# Patient Record
Sex: FEMALE | Race: BLACK OR AFRICAN AMERICAN | NOT HISPANIC OR LATINO | Employment: STUDENT | URBAN - METROPOLITAN AREA
[De-identification: names, ages, dates, MRNs, and addresses within clinical notes are randomized per-mention and may not be internally consistent; named-entity substitution may affect disease eponyms.]

---
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
## 2025-01-06 ENCOUNTER — PHARMACY VISIT (OUTPATIENT)
Dept: PHARMACY | Facility: CLINIC | Age: 20
End: 2025-01-06
Payer: MEDICAID

## 2025-01-06 ENCOUNTER — ROUTINE PRENATAL (OUTPATIENT)
Dept: OBSTETRICS AND GYNECOLOGY | Facility: CLINIC | Age: 20
End: 2025-01-06
Payer: COMMERCIAL

## 2025-01-06 VITALS
DIASTOLIC BLOOD PRESSURE: 74 MMHG | SYSTOLIC BLOOD PRESSURE: 129 MMHG | WEIGHT: 205.39 LBS | BODY MASS INDEX: 28.65 KG/M2

## 2025-01-06 DIAGNOSIS — O99.519 ASTHMA AFFECTING PREGNANCY, ANTEPARTUM (HHS-HCC): ICD-10-CM

## 2025-01-06 DIAGNOSIS — R12 HEARTBURN DURING PREGNANCY IN SECOND TRIMESTER (HHS-HCC): ICD-10-CM

## 2025-01-06 DIAGNOSIS — Z67.91 RH NEGATIVE STATE IN ANTEPARTUM PERIOD (HHS-HCC): ICD-10-CM

## 2025-01-06 DIAGNOSIS — J45.909 ASTHMA AFFECTING PREGNANCY, ANTEPARTUM (HHS-HCC): ICD-10-CM

## 2025-01-06 DIAGNOSIS — O26.899 RH NEGATIVE STATE IN ANTEPARTUM PERIOD (HHS-HCC): ICD-10-CM

## 2025-01-06 DIAGNOSIS — O99.513 ASTHMA AFFECTING PREGNANCY IN THIRD TRIMESTER (HHS-HCC): ICD-10-CM

## 2025-01-06 DIAGNOSIS — Z3A.25 25 WEEKS GESTATION OF PREGNANCY (HHS-HCC): Primary | ICD-10-CM

## 2025-01-06 DIAGNOSIS — O26.892 HEARTBURN DURING PREGNANCY IN SECOND TRIMESTER (HHS-HCC): ICD-10-CM

## 2025-01-06 DIAGNOSIS — J45.909 ASTHMA AFFECTING PREGNANCY IN THIRD TRIMESTER (HHS-HCC): ICD-10-CM

## 2025-01-06 PROCEDURE — RXMED WILLOW AMBULATORY MEDICATION CHARGE

## 2025-01-06 RX ORDER — CALCIUM CARBONATE 200(500)MG
2 TABLET,CHEWABLE ORAL DAILY
Qty: 60 TABLET | Refills: 11 | Status: SHIPPED | OUTPATIENT
Start: 2025-01-06 | End: 2026-01-06

## 2025-01-06 RX ORDER — FAMOTIDINE 20 MG/1
20 TABLET, FILM COATED ORAL 2 TIMES DAILY
Qty: 60 TABLET | Refills: 3 | Status: SHIPPED | OUTPATIENT
Start: 2025-01-06 | End: 2026-01-06

## 2025-01-06 RX ORDER — BUDESONIDE 0.25 MG/2ML
0.25 INHALANT ORAL
Qty: 60 ML | Refills: 11 | Status: CANCELLED | OUTPATIENT
Start: 2025-01-06 | End: 2026-01-06

## 2025-01-06 RX ORDER — BUDESONIDE AND FORMOTEROL FUMARATE DIHYDRATE 160; 4.5 UG/1; UG/1
2 AEROSOL RESPIRATORY (INHALATION)
Qty: 10.2 G | Refills: 11 | Status: SHIPPED | OUTPATIENT
Start: 2025-01-06 | End: 2026-01-06

## 2025-01-06 ASSESSMENT — PAIN SCALES - GENERAL: PAINLEVEL_OUTOF10: 0 - NO PAIN

## 2025-01-06 NOTE — ASSESSMENT & PLAN NOTE
- Patient with worsening asthma this pregnancy, now with nighttime wheezing   - Rx for Symbicort sent; continue albuterol PRN   - Patient desires nebulizer kit for home use - Rx sent for machine at [facility]

## 2025-01-06 NOTE — ASSESSMENT & PLAN NOTE
- Breast pump forms completed today   - For tdap, 2T labs next visit- orders placed. Patient to  [facility]ola prior to appointment   - For Rhogam next visit   - Pepcid, tums sent for heartburn

## 2025-01-06 NOTE — PROGRESS NOTES
"Ob Follow-up  25     SUBJECTIVE    HPI: Juliet Kwok is a 19 y.o.  at 25w0d here for RPNV.      She denies contractions, vaginal bleeding, or LOF. Reports active fetal movement. Patient reports no concerns today. She is considering breast feeding, desires pump. Reports ongoing asthma sx, including nighttime awakenings with wheezing. Requires albuterol multiple times per week. Has heartburn with spicy foods.     She has decided to name her baby \"Ranjeet Billy\"     OBJECTIVE  Visit Vitals  /74   Wt 93.2 kg (205 lb 6.2 oz)   LMP 07/15/2024 (Exact Date)   BMI 28.65 kg/m²   OB Status Pregnant   Smoking Status Never   BSA 2.16 m²            ASSESSMENT & PLAN    Juliet Kwok is a 19 y.o.  at 25w0d here for the following concerns we addressed today:    25 weeks gestation of pregnancy ([facility])  - Breast pump forms completed today   - For tdap, 2T labs next visit- orders placed. Patient to  glucola prior to appointment   - For Rhogam next visit   - Pepcid, tums sent for heartburn       Rh negative state in antepartum period ([facility])  - For Rhogam next visit     Asthma affecting pregnancy, antepartum ([facility])  - Patient with worsening asthma this pregnancy, now with nighttime wheezing   - Rx for Symbicort sent; continue albuterol PRN   - Patient desires nebulizer kit for home use - Rx sent for machine at [facility]       RTC in 4 weeks,     GANGA Ko MD  PGY-II, Obstetrics & Gynecology   [facility]   "

## 2025-01-10 ENCOUNTER — APPOINTMENT (OUTPATIENT)
Dept: OBSTETRICS AND GYNECOLOGY | Facility: CLINIC | Age: 20
End: 2025-01-10
Payer: COMMERCIAL

## 2025-01-19 ENCOUNTER — HOSPITAL ENCOUNTER (OUTPATIENT)
Facility: HOSPITAL | Age: 20
End: 2025-01-19
Attending: OBSTETRICS & GYNECOLOGY | Admitting: OBSTETRICS & GYNECOLOGY
Payer: COMMERCIAL

## 2025-01-19 ENCOUNTER — HOSPITAL ENCOUNTER (EMERGENCY)
Facility: HOSPITAL | Age: 20
Discharge: OTHER INSTITUTION | End: 2025-01-19
Payer: COMMERCIAL

## 2025-01-19 ENCOUNTER — HOSPITAL ENCOUNTER (OUTPATIENT)
Facility: HOSPITAL | Age: 20
Discharge: HOME | End: 2025-01-19
Attending: OBSTETRICS & GYNECOLOGY | Admitting: OBSTETRICS & GYNECOLOGY
Payer: COMMERCIAL

## 2025-01-19 VITALS
RESPIRATION RATE: 20 BRPM | HEIGHT: 71 IN | DIASTOLIC BLOOD PRESSURE: 67 MMHG | OXYGEN SATURATION: 97 % | TEMPERATURE: 98.2 F | SYSTOLIC BLOOD PRESSURE: 127 MMHG | WEIGHT: 205.03 LBS | HEART RATE: 100 BPM | BODY MASS INDEX: 28.7 KG/M2

## 2025-01-19 PROCEDURE — 99214 OFFICE O/P EST MOD 30 MIN: CPT | Performed by: ADVANCED PRACTICE MIDWIFE

## 2025-01-19 PROCEDURE — 59025 FETAL NON-STRESS TEST: CPT | Performed by: ADVANCED PRACTICE MIDWIFE

## 2025-01-19 PROCEDURE — 99212 OFFICE O/P EST SF 10 MIN: CPT | Mod: 25

## 2025-01-19 PROCEDURE — 4500999001 HC ED NO CHARGE

## 2025-01-19 SDOH — HEALTH STABILITY: MENTAL HEALTH: SUICIDAL BEHAVIOR (LIFETIME): NO

## 2025-01-19 SDOH — HEALTH STABILITY: MENTAL HEALTH: WISH TO BE DEAD (PAST 1 MONTH): NO

## 2025-01-19 SDOH — ECONOMIC STABILITY: HOUSING INSECURITY: DO YOU FEEL UNSAFE GOING BACK TO THE PLACE WHERE YOU ARE LIVING?: NO

## 2025-01-19 SDOH — SOCIAL STABILITY: SOCIAL INSECURITY: ARE YOU OR HAVE YOU BEEN THREATENED OR ABUSED PHYSICALLY, EMOTIONALLY, OR SEXUALLY BY ANYONE?: YES

## 2025-01-19 SDOH — SOCIAL STABILITY: SOCIAL INSECURITY: DOES ANYONE TRY TO KEEP YOU FROM HAVING/CONTACTING OTHER FRIENDS OR DOING THINGS OUTSIDE YOUR HOME?: NO

## 2025-01-19 SDOH — HEALTH STABILITY: MENTAL HEALTH: HAVE YOU USED ANY PRESCRIPTION DRUGS OTHER THAN PRESCRIBED IN THE PAST 12 MONTHS?: NO

## 2025-01-19 SDOH — HEALTH STABILITY: MENTAL HEALTH: NON-SPECIFIC ACTIVE SUICIDAL THOUGHTS (PAST 1 MONTH): NO

## 2025-01-19 SDOH — SOCIAL STABILITY: SOCIAL INSECURITY: PHYSICAL ABUSE: YES, PAST (COMMENT)

## 2025-01-19 SDOH — SOCIAL STABILITY: SOCIAL INSECURITY: ABUSE SCREEN: ADULT

## 2025-01-19 SDOH — SOCIAL STABILITY: SOCIAL INSECURITY: HAS ANYONE EVER THREATENED TO HURT YOUR FAMILY OR YOUR PETS?: NO

## 2025-01-19 SDOH — HEALTH STABILITY: MENTAL HEALTH: HAVE YOU USED ANY SUBSTANCES (CANABIS, COCAINE, HEROIN, HALLUCINOGENS, INHALANTS, ETC.) IN THE PAST 12 MONTHS?: NO

## 2025-01-19 SDOH — SOCIAL STABILITY: SOCIAL INSECURITY: ARE THERE ANY APPARENT SIGNS OF INJURIES/BEHAVIORS THAT COULD BE RELATED TO ABUSE/NEGLECT?: NO

## 2025-01-19 SDOH — HEALTH STABILITY: MENTAL HEALTH: WERE YOU ABLE TO COMPLETE ALL THE BEHAVIORAL HEALTH SCREENINGS?: YES

## 2025-01-19 SDOH — SOCIAL STABILITY: SOCIAL INSECURITY: HAVE YOU HAD ANY THOUGHTS OF HARMING ANYONE ELSE?: NO

## 2025-01-19 SDOH — SOCIAL STABILITY: SOCIAL INSECURITY: VERBAL ABUSE: YES, PAST (COMMENT)

## 2025-01-19 SDOH — SOCIAL STABILITY: SOCIAL INSECURITY: DO YOU FEEL ANYONE HAS EXPLOITED OR TAKEN ADVANTAGE OF YOU FINANCIALLY OR OF YOUR PERSONAL PROPERTY?: NO

## 2025-01-19 SDOH — SOCIAL STABILITY: SOCIAL INSECURITY: HAVE YOU HAD THOUGHTS OF HARMING ANYONE ELSE?: NO

## 2025-01-19 ASSESSMENT — LIFESTYLE VARIABLES
HOW MANY STANDARD DRINKS CONTAINING ALCOHOL DO YOU HAVE ON A TYPICAL DAY: PATIENT DOES NOT DRINK
HOW OFTEN DO YOU HAVE 6 OR MORE DRINKS ON ONE OCCASION: NEVER
AUDIT-C TOTAL SCORE: 0
HOW OFTEN DO YOU HAVE A DRINK CONTAINING ALCOHOL: NEVER
SKIP TO QUESTIONS 9-10: 1
AUDIT-C TOTAL SCORE: 0

## 2025-01-19 ASSESSMENT — PATIENT HEALTH QUESTIONNAIRE - PHQ9
2. FEELING DOWN, DEPRESSED OR HOPELESS: NOT AT ALL
SUM OF ALL RESPONSES TO PHQ9 QUESTIONS 1 & 2: 0
1. LITTLE INTEREST OR PLEASURE IN DOING THINGS: NOT AT ALL

## 2025-01-19 ASSESSMENT — PAIN SCALES - GENERAL
PAINLEVEL_OUTOF10: 4
PAINLEVEL_OUTOF10: 8

## 2025-01-19 NOTE — H&P
OB Triage H&P    Assessment/Plan    Juliet Kwok is a 19 y.o.  at 26w6d, ISIDRO: 2025, by Last Menstrual Period, who presents to triage with sharp lower abdominal pain that started while getting off of the toilet approx 2 hours ago.    Plan    -Fetal monitoring reassuring, NST appropriate for gestational age  -Good fetal movement  -Up to date on prenatal care  -Continue routine prenatal care  -Discussed los suspicion for PTL at this time given cervical exam 0/0/high, no contractions. Pain likely MSK vs RLP. Discussed comfort measures including heat and maternity support belt which she has ordered.     Dispo  -Patient appropriate for discharge home, agrees with plan  -Return precautions discussed   -Follow up at next scheduled OB appointment or to triage sooner as needed      Discussed plan and reviewed with: Dr. Hooper who also reviewed NST and is in agreement with plan for discharge to home.     Pregnancy Problems (from 24 to present)       Problem Noted Diagnosed Resolved    Asthma affecting pregnancy, antepartum ([facility]) 2025 by Madai Ko MD  No    Priority:  Medium       Rh negative state in antepartum period ([facility]) 2024 by Maadi Ko MD  No    Priority:  Medium       Bacterial vaginosis in pregnancy ([facility]) 2024 by Madai Ko MD  No    Priority:  Medium       25 weeks gestation of pregnancy ([facility]) 10/7/2024 by Natasha De Leon MD  No    Priority:  Medium       Overview Addendum 2024 12:34 PM by Madai Ko MD     Desired provider in labor: [x] CNM  [] Physician  [] Blood Products: [] Yes, accepts [] No, needs counseling  [x] Initial BMI: Could not be calculated   [x] Prenatal Labs: 10/7  [x] Cervical Cancer Screening: N/A, <20 y/o  [x] Rh status: NEG, needs Rhogam at 28wks  [] Genetic Screening:  ordered  [] NT US: (11-13 wks)  [] Baby ASA (if indicated):  [x] Pregnancy dated by: LMP c/w 6wk US at OSH     [x] Anatomy US:  (19-20 wks): - wnl   [] Federal Sterilization consent signed (if indicated):  [] 1hr GCT at 24-28wks:  [] Rhogam (if indicated):   [] Fetal Surveillance (if indicated):  [] Tdap (27-32 wks, may be given up to 36 wks if initial window missed):   [] RSV (32-36 wks) (Sept. to end of ):   [x] Flu Vaccine: Declined ,       [x] Breastfeeding: Undecided, leaning toward bottle-feeding   [x] Postpartum Birth control method: Considering IUD vs Nexplanon   [] GBS at 36 - 37 wks:  [] 39 weeks discussion of IOL vs. Expectant management:  [] Mode of delivery ( anticipated ):          Vaginal bleeding in pregnancy, first trimester ([facility]) 10/7/2024 by Natasha De Leon MD  No    Priority:  Medium       Chlamydia infection affecting pregnancy in first trimester ([facility]) 2024 by AALIYAH Ngo  No    Priority:  Medium       Overview Signed 2024  2:13 PM by AALIYAH Ngo     +chlamydia , treated with azithromycin 8/15/2024  - MARTIN in 3-4 weeks         Back pain in pregnancy ([facility]) 2024 by Madai Ko MD  2024 by Madai Ko MD    Priority:  Medium       Pregnancy of unknown anatomic location ([facility]) 2024 by AALIYAH Ngo  10/7/2024 by Natasha De Leon MD    Priority:  Medium               Subjective   Trejorge presents with sharp lower abdominal pain that started at work earlier this evening while trying to get off of the toilet. States the pain was sharp and shooting. She denies any LOF, VB, contractions, endorses good FM. She has not taken anything for this pain. States that pain seems to have improved with heat packs and lying down in triage.     Prenatal Provider [place] [facility]     OB History    Para Term  AB Living   1 0 0 0 0 0   SAB IAB Ectopic Multiple Live Births   0 0 0 0 0      # Outcome Date GA Lbr Yoseph/2nd Weight Sex Type Anes PTL Lv   1 Current                No past surgical history on  file.    Social History     Tobacco Use    Smoking status: Never    Smokeless tobacco: Never   Substance Use Topics    Alcohol use: Never       No Known Allergies    Medications Prior to Admission   Medication Sig Dispense Refill Last Dose/Taking    albuterol 2.5 mg /3 mL (0.083 %) nebulizer solution Take 3 mL (2.5 mg) by nebulization every 6 hours if needed for wheezing. 75 mL 11     albuterol 2.5 mg /3 mL (0.083 %) nebulizer solution Take 3 mL (2.5 mg) by nebulization every 6 hours if needed for wheezing. 75 mL 11     albuterol 90 mcg/actuation inhaler Inhale 2 puffs every 6 hours if needed for wheezing. 18 g 11     aspirin 81 mg EC tablet Take 2 tablets (162 mg) by mouth once daily. Start after 12 weeks of pregnancy 60 tablet 11     budesonide-formoteroL (Symbicort) 160-4.5 mcg/actuation inhaler Inhale 2 puffs 2 times a day. Rinse mouth with water after use to reduce aftertaste and incidence of candidiasis. Do not swallow. 10.2 g 11     calcium carbonate (Tums) 200 mg calcium chewable tablet Chew 2 tablets (1,000 mg) once daily. 60 tablet 11     docosanol cream (Abreva) 10 % cream cream Apply topically 5 times a day. APPLY AND RUB IN until healed       famotidine (Pepcid) 20 mg tablet Take 1 tablet (20 mg) by mouth 2 times a day. For heartburn 60 tablet 3     lidocaine (Lidoderm) 5 % patch Place 1 patch over 12 hours on the skin once daily. Remove & discard patch within 12 hours or as directed by MD. Do not place over abdomen. 10 patch 0     loratadine (Claritin) 10 mg tablet Take 1 tablet (10 mg) by mouth once daily as needed for allergies.       prenatal vitamin, iron-folic, 27 mg iron-800 mcg folic acid tablet Take 1 tablet by mouth once daily. 30 tablet 0      Objective     Last Vitals  Temp Pulse Resp BP MAP O2 Sat   36.8 °C (98.2 °F) 90 20 127/67 (Simultaneous filing. User may not have seen previous data.) 90 98 %     Blood Pressures         1/19/2025  0117             BP: 127/67             Physical  Exam  General: NAD, mood appropriate  Cardiopulmonary: warm and well perfused, breathing comfortably on room air  Abdomen: Gravid, non-tender  Extremities: Symmetric  Speculum Exam: deferred  Cervix:  closed/long/high      Fetal Monitoring  , moderate, +10x10 accels, -decel  [place]: quiet      Bedside ultrasound: No    Labs in chart were reviewed.          Prenatal labs reviewed, not remarkable.

## 2025-01-24 ENCOUNTER — APPOINTMENT (OUTPATIENT)
Dept: OBSTETRICS AND GYNECOLOGY | Facility: CLINIC | Age: 20
End: 2025-01-24
Payer: COMMERCIAL

## 2025-02-03 ENCOUNTER — ROUTINE PRENATAL (OUTPATIENT)
Dept: OBSTETRICS AND GYNECOLOGY | Facility: CLINIC | Age: 20
End: 2025-02-03
Payer: COMMERCIAL

## 2025-02-03 ENCOUNTER — LAB (OUTPATIENT)
Dept: LAB | Facility: HOSPITAL | Age: 20
End: 2025-02-03
Payer: COMMERCIAL

## 2025-02-03 VITALS — BODY MASS INDEX: 29.93 KG/M2 | DIASTOLIC BLOOD PRESSURE: 78 MMHG | WEIGHT: 214.6 LBS | SYSTOLIC BLOOD PRESSURE: 118 MMHG

## 2025-02-03 DIAGNOSIS — Z67.91 RH NEGATIVE STATE IN ANTEPARTUM PERIOD (HHS-HCC): Primary | ICD-10-CM

## 2025-02-03 DIAGNOSIS — Z3A.21 21 WEEKS GESTATION OF PREGNANCY (HHS-HCC): ICD-10-CM

## 2025-02-03 DIAGNOSIS — Z3A.29 29 WEEKS GESTATION OF PREGNANCY (HHS-HCC): ICD-10-CM

## 2025-02-03 DIAGNOSIS — O26.899 RH NEGATIVE STATE IN ANTEPARTUM PERIOD (HHS-HCC): Primary | ICD-10-CM

## 2025-02-03 PROBLEM — B96.89 BACTERIAL VAGINOSIS IN PREGNANCY (HHS-HCC): Status: RESOLVED | Noted: 2024-11-04 | Resolved: 2025-02-03

## 2025-02-03 PROBLEM — Z86.19 H/O COLD SORES: Status: RESOLVED | Noted: 2024-08-29 | Resolved: 2025-02-03

## 2025-02-03 PROBLEM — A74.9 CHLAMYDIA INFECTION AFFECTING PREGNANCY IN FIRST TRIMESTER (HHS-HCC): Status: RESOLVED | Noted: 2024-08-19 | Resolved: 2025-02-03

## 2025-02-03 PROBLEM — B00.1 RECURRENT COLD SORES: Status: RESOLVED | Noted: 2023-09-26 | Resolved: 2025-02-03

## 2025-02-03 PROBLEM — L83 ACANTHOSIS NIGRICANS: Status: RESOLVED | Noted: 2023-09-26 | Resolved: 2025-02-03

## 2025-02-03 PROBLEM — O98.811 CHLAMYDIA INFECTION AFFECTING PREGNANCY IN FIRST TRIMESTER (HHS-HCC): Status: RESOLVED | Noted: 2024-08-19 | Resolved: 2025-02-03

## 2025-02-03 PROBLEM — O23.599 BACTERIAL VAGINOSIS IN PREGNANCY (HHS-HCC): Status: RESOLVED | Noted: 2024-11-04 | Resolved: 2025-02-03

## 2025-02-03 PROCEDURE — 90471 IMMUNIZATION ADMIN: CPT | Mod: GC

## 2025-02-03 PROCEDURE — 2500000004 HC RX 250 GENERAL PHARMACY W/ HCPCS (ALT 636 FOR OP/ED): Mod: SE | Performed by: STUDENT IN AN ORGANIZED HEALTH CARE EDUCATION/TRAINING PROGRAM

## 2025-02-03 PROCEDURE — 96372 THER/PROPH/DIAG INJ SC/IM: CPT | Performed by: STUDENT IN AN ORGANIZED HEALTH CARE EDUCATION/TRAINING PROGRAM

## 2025-02-03 PROCEDURE — 99214 OFFICE O/P EST MOD 30 MIN: CPT | Mod: 25,GC,TH

## 2025-02-03 PROCEDURE — 99214 OFFICE O/P EST MOD 30 MIN: CPT

## 2025-02-03 RX ADMIN — HUMAN RHO(D) IMMUNE GLOBULIN 300 MCG: 300 INJECTION, SOLUTION INTRAMUSCULAR at 11:58

## 2025-02-03 NOTE — PROGRESS NOTES
Ob Visit  25     SUBJECTIVE      HPI: Juliet Kwok is a 19 y.o.  at 29w0d here for RPNV.  She has no contractions, no bleeding, and no LOF. Reports normal fetal movement.  Patient reports no concerns.  Has questions about TDaP and Rhogam.       OBJECTIVE  Visit Vitals  /78 Comment: 114   Wt 97.3 kg (214 lb 9.6 oz)   LMP 07/15/2024 (Exact Date)   BMI 29.93 kg/m²   OB Status Pregnant   Smoking Status Never   BSA 2.21 m²            ASSESSMENT & PLAN    Juliet Kwok is a 19 y.o.  at 29w0d here for the following concerns we addressed today:    Rh negative state in antepartum period ([facility])  - Rhogam given today    29 weeks gestation of pregnancy ([facility])  - TDaP given today        Orders Placed This Encounter   Procedures    Tdap vaccine, age 7 years and older  (BOOSTRIX)    Type And Screen Is this order related to pregnancy or an upcoming surgery? Yes; Where will this surgery/delivery be performed? [facility]; What is the date of the surgery? 2025 (N/A); Has this patient ever had a transfusion? Unk...       RTC in 2 weeks      Flor Dickson MD

## 2025-02-03 NOTE — PROGRESS NOTES
OB Follow-up  25     SUBJECTIVE    HPI: Juliet Kwok is a 19 y.o.  at 29w0d here for RPNV. Denies ***contractions, ***bleeding, or ***LOF. Reports ***normal fetal movement. Patient reports ***      OBJECTIVE  Visit Vitals  LMP 07/15/2024 (Exact Date)   OB Status Pregnant   Smoking Status Never      FHT: ***    ASSESSMENT & PLAN    Juliet Kwok is a 19 y.o.  at 29w0d here for the following concerns we addressed today:    No problem-specific Assessment & Plan notes found for this encounter.       No orders of the defined types were placed in this encounter.       RTC in *** weeks    Patient seen and evaluated with  ***    Natasha De Leon MD   PGY-2, Obstetrics and Gynecology

## 2025-02-05 LAB
GLUCOSE 1H P 50 G GLC PO SERPL-MCNC: 93 MG/DL
T PALLIDUM AB SER QL IA: NEGATIVE

## 2025-02-14 ENCOUNTER — APPOINTMENT (OUTPATIENT)
Dept: OBSTETRICS AND GYNECOLOGY | Facility: CLINIC | Age: 20
End: 2025-02-14
Payer: COMMERCIAL

## 2025-02-17 ENCOUNTER — ROUTINE PRENATAL (OUTPATIENT)
Dept: OBSTETRICS AND GYNECOLOGY | Facility: CLINIC | Age: 20
End: 2025-02-17
Payer: COMMERCIAL

## 2025-02-17 VITALS — DIASTOLIC BLOOD PRESSURE: 72 MMHG | WEIGHT: 210 LBS | SYSTOLIC BLOOD PRESSURE: 128 MMHG | BODY MASS INDEX: 29.29 KG/M2

## 2025-02-17 DIAGNOSIS — J45.909 ASTHMA AFFECTING PREGNANCY, ANTEPARTUM (HHS-HCC): ICD-10-CM

## 2025-02-17 DIAGNOSIS — O09.93 SUPERVISION OF HIGH RISK PREGNANCY IN THIRD TRIMESTER (HHS-HCC): ICD-10-CM

## 2025-02-17 DIAGNOSIS — Z3A.31 31 WEEKS GESTATION OF PREGNANCY (HHS-HCC): Primary | ICD-10-CM

## 2025-02-17 DIAGNOSIS — O99.519 ASTHMA AFFECTING PREGNANCY, ANTEPARTUM (HHS-HCC): ICD-10-CM

## 2025-02-17 ASSESSMENT — ENCOUNTER SYMPTOMS
MUSCULOSKELETAL NEGATIVE: 0
NEUROLOGICAL NEGATIVE: 0
ALLERGIC/IMMUNOLOGIC NEGATIVE: 0
EYES NEGATIVE: 0
GASTROINTESTINAL NEGATIVE: 0
HEMATOLOGIC/LYMPHATIC NEGATIVE: 0
RESPIRATORY NEGATIVE: 0
PSYCHIATRIC NEGATIVE: 0
ENDOCRINE NEGATIVE: 0
CONSTITUTIONAL NEGATIVE: 0
CARDIOVASCULAR NEGATIVE: 0

## 2025-02-17 NOTE — PROGRESS NOTES
OB Follow-up  25     SUBJECTIVE    HPI: Juliet Kwok is a 19 y.o.  at 31w0d here for RPNV. Denies contractions, bleeding, or LOF. Reports normal fetal movement. Patient reports recent GI illness that she is recovered from.  Never received nebulization machine from [facility], breast pump from [employer].      OBJECTIVE  Visit Vitals  /72 Comment: pluse-83   Wt 95.3 kg (210 lb)   LMP 07/15/2024 (Exact Date)   BMI 29.29 kg/m²   OB Status Pregnant   Smoking Status Never   BSA 2.18 m²   Fundal height: 31cm  FHT: 140s    ASSESSMENT & PLAN  Juliet Kwok is a 19 y.o.  at 31w0d here for the following concerns we addressed today:    Asthma affecting pregnancy, antepartum ([facility])  At baseline today.  Never heard back from [facility] regarding nebulizer machine, encouraged to call pharmacy again for DME. If unable to obtain, will touch base with Rns in next prenatal visit in 2 weeks.    31 weeks gestation of pregnancy ([facility])  Prenatal care up to date.  CBC not sent on  tri labs, needs to be  carveout. Will need to be repeated in 2 weeks at next prenatal visit.  Issue with obtaining breast pump, will discuss with RN.       Orders Placed This Encounter   Procedures    CBC Anemia Panel With Reflex, Pregnancy     Standing Status:   Future     Number of Occurrences:   1     Standing Expiration Date:   2026     Order Specific Question:   [facility]     Answer:   Immediate [1]     Order Specific Question:   [facility] Carveout?     Answer:   CARVEOUT: SEND TO [facility]        RTC in 2 weeks    Discussed with Dr. Yessi De Leon MD   PGY-2, Obstetrics and Gynecology

## 2025-02-17 NOTE — ASSESSMENT & PLAN NOTE
Prenatal care up to date.  CBC not sent on 2nd tri labs, needs to be [facility] carveout. Will need to be repeated in 2 weeks at next prenatal visit.  Issue with obtaining breast pump, will discuss with RN.

## 2025-02-17 NOTE — ASSESSMENT & PLAN NOTE
At baseline today.  Never heard back from [facility] regarding nebulizer machine, encouraged to call pharmacy again for DME. If unable to obtain, will touch base with Rns in next prenatal visit in 2 weeks.

## 2025-02-28 ENCOUNTER — APPOINTMENT (OUTPATIENT)
Dept: OBSTETRICS AND GYNECOLOGY | Facility: CLINIC | Age: 20
End: 2025-02-28
Payer: COMMERCIAL

## 2025-03-02 PROBLEM — Z3A.33 33 WEEKS GESTATION OF PREGNANCY (HHS-HCC): Status: ACTIVE | Noted: 2024-10-07

## 2025-03-02 NOTE — PROGRESS NOTES
Ob Follow-up  25     SUBJECTIVE  HPI: Juliet Kwok is a 19 y.o.  at 33w0d here for RPNV.  Patient complains of pelvic pressure. This pressure 10/10 pain and is constant during the duration of it. She feels it may be positional. Over the past week this has occurred 3-4 times and it will last for 2-3 hours. This began about 3 weeks ago. Patient is having <1 headache per week and goes away. Patient is not taking any medication for headache or pelvic pressure. She has having contractions for about 5 minutes at a time, about twice a week, beginning 3 weeks ago., no vaginal bleeding, or no LOF. Reports increasing normal fetal movement. Patient reports no change in vaginal discharge or leakage.     Patient has not gotten nebulizer from [facility] [employer] yet.       Patient would like STI testing today, as her partner had exposure to gonorrhea/chlamdyia.    OBJECTIVE  Visit Vitals  /80   Pulse 97   Wt 100 kg (220 lb 12.8 oz)   LMP 07/15/2024 (Exact Date)   BMI 30.80 kg/m²   OB Status Pregnant   Smoking Status Never   BSA 2.24 m²        ASSESSMENT & PLAN    Juliet Kwok is a 19 y.o.  at 32w6d here for the following concerns we addressed today:    Problem List Items Addressed This Visit       Rh negative state in antepartum period ([facility])    Overview     - Rhogam given 2/3/25         Housing insecurity    Current Assessment & Plan     Connected with "[employer] planning to stop by after her appointment         Asthma affecting pregnancy, antepartum ([facility])    Current Assessment & Plan     Rarely using albuterol, not at 2x/day         33 weeks gestation of pregnancy ([facility]) - Primary    Overview     Desired provider in labor: [x] CNM  [] Physician  [x] Blood Products: [x] Yes, accepts [] No, needs counseling  [x] Initial BMI: Could not be calculated   [x] Prenatal Labs: 10/7  [x] Cervical Cancer Screening: N/A, <20 y/o  [x] Rh status: NEG, needs Rhogam at 28wks  [x] Genetic Screening:  ki  cfDNA  [x] NT US: (11-13 wks) nml  [x] Baby ASA (if indicated):  [x] Pregnancy dated by: LMP c/w 6wk US at OSH     [x] Anatomy US: (19-20 wks): 11/25- wnl   [x] Federal Sterilization consent signed (if indicated): n/a  [x] 1hr GCT at 24-28wks: drawn 2/3/25  [x] Rhogam (if indicated): 2/3/25  [] Fetal Surveillance (if indicated):  [x] Tdap (27-32 wks, may be given up to 36 wks if initial window missed): 2/3/25  [x] RSV (32-36 wks) (Sept. to end of Jan): not offered  [x] Flu Vaccine: Declined 11/4, 12/12   [ ] needs 2nd tri CBC     [x] Breastfeeding: Undecided, leaning toward bottle-feeding   [x] Postpartum Birth control method: Considering IUD vs Nexplanon   [] GBS at 36 - 37 wks:  [] 39 weeks discussion of IOL vs. Expectant management:  [] Mode of delivery (anticipated):          Current Assessment & Plan     To lab for 3rd tri CBC and HIV today         Relevant Orders    Follow Up In Obstetrics     Other Visit Diagnoses       Screening examination for STI        Relevant Orders    C. trachomatis / N. gonorrhoeae, Amplified, Urogenital    Trichomonas vaginalis, Amplified    HIV 1/2 Antigen/Antibody Screen with Reflex to Confirmation              Orders Placed This Encounter   Procedures    C. trachomatis / N. gonorrhoeae, Amplified, Urogenital     Order Specific Question:   Release result to MyChart     Answer:   Immediate    Trichomonas vaginalis, Amplified     Order Specific Question:   Release result to MyChart     Answer:   Immediate    HIV 1/2 Antigen/Antibody Screen with Reflex to Confirmation     Standing Status:   Future     Number of Occurrences:   1     Standing Expiration Date:   3/3/2026     Order Specific Question:   Release result to MyChart     Answer:   Immediate [1]        RTC in 2 weeks    Mahesh Tse, MS3    Patient seen and evaluated with medical student. I agree with the assessment above, and have made edits within text.  Patient discussed with Dr. Lenin Nicole, PGY-2

## 2025-03-03 ENCOUNTER — LAB (OUTPATIENT)
Dept: LAB | Facility: HOSPITAL | Age: 20
End: 2025-03-03
Payer: COMMERCIAL

## 2025-03-03 ENCOUNTER — ROUTINE PRENATAL (OUTPATIENT)
Dept: OBSTETRICS AND GYNECOLOGY | Facility: CLINIC | Age: 20
End: 2025-03-03
Payer: COMMERCIAL

## 2025-03-03 VITALS
BODY MASS INDEX: 30.8 KG/M2 | SYSTOLIC BLOOD PRESSURE: 128 MMHG | HEART RATE: 97 BPM | WEIGHT: 220.8 LBS | DIASTOLIC BLOOD PRESSURE: 80 MMHG

## 2025-03-03 DIAGNOSIS — Z59.819 HOUSING INSECURITY: ICD-10-CM

## 2025-03-03 DIAGNOSIS — O99.519 ASTHMA AFFECTING PREGNANCY, ANTEPARTUM (HHS-HCC): ICD-10-CM

## 2025-03-03 DIAGNOSIS — Z3A.31 31 WEEKS GESTATION OF PREGNANCY (HHS-HCC): Primary | ICD-10-CM

## 2025-03-03 DIAGNOSIS — Z11.3 SCREENING EXAMINATION FOR STI: ICD-10-CM

## 2025-03-03 DIAGNOSIS — Z3A.33 33 WEEKS GESTATION OF PREGNANCY (HHS-HCC): Primary | ICD-10-CM

## 2025-03-03 DIAGNOSIS — Z67.91 RH NEGATIVE STATE IN ANTEPARTUM PERIOD (HHS-HCC): ICD-10-CM

## 2025-03-03 DIAGNOSIS — O26.899 RH NEGATIVE STATE IN ANTEPARTUM PERIOD (HHS-HCC): ICD-10-CM

## 2025-03-03 DIAGNOSIS — J45.909 ASTHMA AFFECTING PREGNANCY, ANTEPARTUM (HHS-HCC): ICD-10-CM

## 2025-03-03 LAB
ERYTHROCYTE [DISTWIDTH] IN BLOOD BY AUTOMATED COUNT: 14.1 % (ref 11.5–14.5)
FERRITIN SERPL-MCNC: 23 NG/ML
FOLATE SERPL-MCNC: 6.7 NG/ML
HCT VFR BLD AUTO: 29.4 % (ref 36–46)
HGB BLD-MCNC: 9.1 G/DL (ref 12–16)
IRON SATN MFR SERPL: NORMAL %
IRON SERPL-MCNC: 34 UG/DL
MCH RBC QN AUTO: 25.6 PG (ref 26–34)
MCHC RBC AUTO-ENTMCNC: 31 G/DL (ref 32–36)
MCV RBC AUTO: 83 FL (ref 80–100)
NRBC BLD-RTO: 0 /100 WBCS (ref 0–0)
PLATELET # BLD AUTO: 365 X10*3/UL (ref 150–450)
RBC # BLD AUTO: 3.55 X10*6/UL (ref 4–5.2)
TIBC SERPL-MCNC: NORMAL UG/DL
UIBC SERPL-MCNC: >450 UG/DL
VIT B12 SERPL-MCNC: 238 PG/ML
WBC # BLD AUTO: 11.4 X10*3/UL (ref 4.4–11.3)

## 2025-03-03 PROCEDURE — 82728 ASSAY OF FERRITIN: CPT

## 2025-03-03 PROCEDURE — 85027 COMPLETE CBC AUTOMATED: CPT

## 2025-03-03 PROCEDURE — 99213 OFFICE O/P EST LOW 20 MIN: CPT

## 2025-03-03 PROCEDURE — 82746 ASSAY OF FOLIC ACID SERUM: CPT

## 2025-03-03 PROCEDURE — 99213 OFFICE O/P EST LOW 20 MIN: CPT | Mod: GC,TH

## 2025-03-03 PROCEDURE — 83550 IRON BINDING TEST: CPT

## 2025-03-03 PROCEDURE — 82607 VITAMIN B-12: CPT

## 2025-03-03 SDOH — ECONOMIC STABILITY - HOUSING INSECURITY: HOUSING INSTABILITY UNSPECIFIED: Z59.819

## 2025-03-03 NOTE — PROGRESS NOTES
I saw and evaluated the patient. I personally obtained the key and critical portions of the history and physical exam or was physically present for key and critical portions performed by the resident/fellow. I reviewed the resident/fellow's documentation and discussed the patient with the resident/fellow. I agree with the resident/fellow's medical decision making as documented in the note.    Kirsten Phelps MD

## 2025-03-04 ENCOUNTER — TELEPHONE (OUTPATIENT)
Dept: OBSTETRICS AND GYNECOLOGY | Facility: CLINIC | Age: 20
End: 2025-03-04
Payer: COMMERCIAL

## 2025-03-04 DIAGNOSIS — D50.9 IRON DEFICIENCY ANEMIA DURING PREGNANCY (HHS-HCC): Primary | ICD-10-CM

## 2025-03-04 DIAGNOSIS — O99.019 IRON DEFICIENCY ANEMIA DURING PREGNANCY (HHS-HCC): Primary | ICD-10-CM

## 2025-03-04 LAB
C TRACH RRNA SPEC QL NAA+PROBE: NOT DETECTED
HIV 1+2 AB+HIV1 P24 AG SERPL QL IA: NORMAL
N GONORRHOEA RRNA SPEC QL NAA+PROBE: NOT DETECTED
QUEST GC CT AMPLIFIED (ALWAYS MESSAGE): NORMAL
REFLEX ADDED, ANEMIA PANEL: NORMAL
T VAGINALIS RRNA SPEC QL NAA+PROBE: NOT DETECTED

## 2025-03-04 PROCEDURE — RXMED WILLOW AMBULATORY MEDICATION CHARGE

## 2025-03-04 RX ORDER — FERROUS SULFATE 325(65) MG
325 TABLET ORAL 2 TIMES DAILY
Qty: 60 TABLET | Refills: 1 | Status: SHIPPED | OUTPATIENT
Start: 2025-03-04 | End: 2025-05-03

## 2025-03-04 RX ORDER — POLYETHYLENE GLYCOL 3350 17 G/17G
17 POWDER, FOR SOLUTION ORAL DAILY
Qty: 60 PACKET | Refills: 0 | Status: SHIPPED | OUTPATIENT
Start: 2025-03-04 | End: 2025-05-03

## 2025-03-04 NOTE — TELEPHONE ENCOUNTER
Pt notified of need for iron, side effects, taking with oj and not with milk products. Discussed high iron foods    ----- Message from Flor Dickson sent at 3/4/2025 10:53 AM EST -----  Iron deficiency anemia, sending Rx and MyChart message but please contact patient by phone to assure she is aware and answer questions.

## 2025-03-07 ENCOUNTER — PHARMACY VISIT (OUTPATIENT)
Dept: PHARMACY | Facility: CLINIC | Age: 20
End: 2025-03-07
Payer: MEDICAID

## 2025-03-14 ENCOUNTER — APPOINTMENT (OUTPATIENT)
Dept: OBSTETRICS AND GYNECOLOGY | Facility: CLINIC | Age: 20
End: 2025-03-14
Payer: COMMERCIAL

## 2025-03-15 PROBLEM — Z3A.35 35 WEEKS GESTATION OF PREGNANCY (HHS-HCC): Status: ACTIVE | Noted: 2024-10-07

## 2025-03-17 ENCOUNTER — APPOINTMENT (OUTPATIENT)
Dept: LAB | Facility: HOSPITAL | Age: 20
End: 2025-03-17
Payer: COMMERCIAL

## 2025-03-17 ENCOUNTER — ROUTINE PRENATAL (OUTPATIENT)
Dept: OBSTETRICS AND GYNECOLOGY | Facility: CLINIC | Age: 20
End: 2025-03-17
Payer: COMMERCIAL

## 2025-03-17 VITALS
WEIGHT: 229.9 LBS | SYSTOLIC BLOOD PRESSURE: 117 MMHG | BODY MASS INDEX: 32.06 KG/M2 | HEART RATE: 121 BPM | DIASTOLIC BLOOD PRESSURE: 73 MMHG

## 2025-03-17 DIAGNOSIS — O99.519 ASTHMA AFFECTING PREGNANCY, ANTEPARTUM (HHS-HCC): ICD-10-CM

## 2025-03-17 DIAGNOSIS — J45.909 ASTHMA AFFECTING PREGNANCY, ANTEPARTUM (HHS-HCC): ICD-10-CM

## 2025-03-17 DIAGNOSIS — O99.019 IRON DEFICIENCY ANEMIA DURING PREGNANCY (HHS-HCC): ICD-10-CM

## 2025-03-17 DIAGNOSIS — Z3A.35 35 WEEKS GESTATION OF PREGNANCY (HHS-HCC): Primary | ICD-10-CM

## 2025-03-17 DIAGNOSIS — D50.9 IRON DEFICIENCY ANEMIA DURING PREGNANCY (HHS-HCC): ICD-10-CM

## 2025-03-17 DIAGNOSIS — Z59.819 HOUSING INSECURITY: ICD-10-CM

## 2025-03-17 DIAGNOSIS — O09.93 SUPERVISION OF HIGH-RISK PREGNANCY, THIRD TRIMESTER (HHS-HCC): ICD-10-CM

## 2025-03-17 DIAGNOSIS — Z30.09 GENERAL COUNSELLING AND ADVICE ON CONTRACEPTION: ICD-10-CM

## 2025-03-17 LAB
ABO GROUP (TYPE) IN BLOOD: NORMAL
ANTIBODY SCREEN: NORMAL
RH FACTOR (ANTIGEN D): NORMAL

## 2025-03-17 PROCEDURE — 86850 RBC ANTIBODY SCREEN: CPT

## 2025-03-17 PROCEDURE — 86901 BLOOD TYPING SEROLOGIC RH(D): CPT

## 2025-03-17 PROCEDURE — 99213 OFFICE O/P EST LOW 20 MIN: CPT | Mod: GC,TH

## 2025-03-17 PROCEDURE — 86870 RBC ANTIBODY IDENTIFICATION: CPT

## 2025-03-17 PROCEDURE — 99213 OFFICE O/P EST LOW 20 MIN: CPT

## 2025-03-17 PROCEDURE — 86900 BLOOD TYPING SEROLOGIC ABO: CPT

## 2025-03-17 SDOH — ECONOMIC STABILITY - HOUSING INSECURITY: HOUSING INSTABILITY UNSPECIFIED: Z59.819

## 2025-03-17 ASSESSMENT — ENCOUNTER SYMPTOMS
EYES NEGATIVE: 0
PSYCHIATRIC NEGATIVE: 0
GASTROINTESTINAL NEGATIVE: 0
MUSCULOSKELETAL NEGATIVE: 0
HEMATOLOGIC/LYMPHATIC NEGATIVE: 0
CARDIOVASCULAR NEGATIVE: 0
CONSTITUTIONAL NEGATIVE: 0
ENDOCRINE NEGATIVE: 0
ALLERGIC/IMMUNOLOGIC NEGATIVE: 0
NEUROLOGICAL NEGATIVE: 0
RESPIRATORY NEGATIVE: 0

## 2025-03-17 ASSESSMENT — PATIENT HEALTH QUESTIONNAIRE - PHQ9
1. LITTLE INTEREST OR PLEASURE IN DOING THINGS: NOT AT ALL
2. FEELING DOWN, DEPRESSED OR HOPELESS: NOT AT ALL
SUM OF ALL RESPONSES TO PHQ9 QUESTIONS 1 AND 2: 0

## 2025-03-17 ASSESSMENT — PAIN - FUNCTIONAL ASSESSMENT: PAIN_FUNCTIONAL_ASSESSMENT: 0-10

## 2025-03-17 ASSESSMENT — PAIN SCALES - GENERAL: PAINLEVEL_OUTOF10: 0 - NO PAIN

## 2025-03-17 NOTE — PROGRESS NOTES
I saw and evaluated the patient. I personally obtained the key and critical portions of the history and physical exam or was physically present for key and critical portions performed by the resident/fellow. I reviewed the resident/fellow's documentation and discussed the patient with the resident/fellow. I agree with the resident/fellow's medical decision making as documented in the note.    Flor Dickson MD

## 2025-03-17 NOTE — PROGRESS NOTES
Ob Follow-up  25     SUBJECTIVE      HPI: Juliet Kwok is a 19 y.o.  at 35w0d here for RPNV.  She has no contractions, bleeding, or LOF. Reports normal fetal movement.        OBJECTIVE  Visit Vitals  /73 Comment: 2nd reading   Pulse (!) 121   Wt 104 kg (229 lb 14.4 oz)   LMP 07/15/2024 (Exact Date)   BMI 32.06 kg/m²   OB Status Pregnant   Smoking Status Never   BSA 2.28 m²        ASSESSMENT & PLAN    Juliet Kwok is a 19 y.o.  at 35w0d here for the following concerns we addressed today:    Problem List Items Addressed This Visit       Iron deficiency anemia during pregnancy ([facility])    Overview     - noted on CBC 3/3/25, rx sent 3/4/25  - needs reticulocyte count and repeat CBC after two weeks of therapy         Current Assessment & Plan     CBC and retics today. Discussed IV iron if labs not appropriate         Relevant Orders    CBC    Reticulocytes    Housing insecurity    Current Assessment & Plan     Moved into new apartment, which is going well         Asthma affecting pregnancy, antepartum ([facility])    Current Assessment & Plan     Not taking her Symbicort, discussed restarting since she has needed her inhaler more this week         35 weeks gestation of pregnancy ([facility]) - Primary    Overview     Desired provider in labor: [x] CNM  [] Physician  [x] Blood Products: [x] Yes, accepts [] No, needs counseling  [x] Initial BMI: Could not be calculated   [x] Prenatal Labs: 10/7  [x] Cervical Cancer Screening: N/A, <20 y/o  [x] Rh status: NEG, needs Rhogam at 28wks  [x] Genetic Screening:  rr cfDNA  [x] NT US: (11-13 wks) nml  [x] Baby ASA (if indicated):  [x] Pregnancy dated by: LMP c/w 6wk US at OSH     [x] Anatomy US: (19-20 wks): - wnl   [x] Federal Sterilization consent signed (if indicated): n/a  [x] 1hr GCT at 24-28wks: drawn 2/3/25  [x] Rhogam (if indicated): 2/3/25  [] Fetal Surveillance (if indicated):  [x] Tdap (27-32 wks, may be given up to 36 wks if initial  window missed): 2/3/25  [x] RSV (32-36 wks) (Sept. to end ): not offered  [x] Flu Vaccine: Declined ,    [x] Breastfeeding: Undecided, leaning toward bottle-feeding. Breast pump ordered  [x] Postpartum Birth control method: leaning towards Nexplanon   [] GBS at 36 - 37 wks: next week  [] 39 weeks discussion of IOL vs. Expectant management: IOL  [] Mode of delivery (anticipated):  anticipated          Current Assessment & Plan     Counseled on contraceptive options. Leaning towards Nexplanon and desires effective and long term option  GBS next week  Cephalic on BSUS          Other Visit Diagnoses       Supervision of high-risk pregnancy, third trimester ([facility])        Relevant Orders    CBC    Reticulocytes    General counselling and advice on contraception                  Orders Placed This Encounter   Procedures    CBC     Standing Status:   Future     Number of Occurrences:   1     Standing Expiration Date:   3/17/2026     Order Specific Question:   Release result to MyChart     Answer:   Immediate [1]    Reticulocytes     Standing Status:   Future     Number of Occurrences:   1     Standing Expiration Date:   3/17/2026     Order Specific Question:   Release result to MyChart     Answer:   Immediate [1]        RTC in 1 week    Discussed with Dr. Yessi Nicole MD, PGY-2

## 2025-03-17 NOTE — ASSESSMENT & PLAN NOTE
Counseled on contraceptive options. Leaning towards Nexplanon and desires effective and long term option  GBS next week  Cephalic on BSUS

## 2025-03-18 LAB
BB ANTIBODY IDENTIFICATION: NORMAL
CASE #: NORMAL
ERYTHROCYTE [DISTWIDTH] IN BLOOD BY AUTOMATED COUNT: 13.8 % (ref 11–15)
HCT VFR BLD AUTO: 28.2 % (ref 35–45)
HGB BLD-MCNC: 8.9 G/DL (ref 11.7–15.5)
MCH RBC QN AUTO: 25.1 PG (ref 27–33)
MCHC RBC AUTO-ENTMCNC: 31.6 G/DL (ref 32–36)
MCV RBC AUTO: 79.4 FL (ref 80–100)
PLATELET # BLD AUTO: 343 THOUSAND/UL (ref 140–400)
PMV BLD REES-ECKER: 9.6 FL (ref 7.5–12.5)
RBC # BLD AUTO: 3.55 MILLION/UL (ref 3.8–5.1)
RETICS #: ABNORMAL CELLS/UL (ref 20000–80000)
RETICS/RBC NFR AUTO: 2.3 %
WBC # BLD AUTO: 14.8 THOUSAND/UL (ref 3.8–10.8)

## 2025-03-19 RX ORDER — FAMOTIDINE 10 MG/ML
20 INJECTION, SOLUTION INTRAVENOUS ONCE AS NEEDED
OUTPATIENT
Start: 2025-03-20

## 2025-03-19 RX ORDER — DIPHENHYDRAMINE HYDROCHLORIDE 50 MG/ML
50 INJECTION, SOLUTION INTRAMUSCULAR; INTRAVENOUS AS NEEDED
OUTPATIENT
Start: 2025-03-20

## 2025-03-19 RX ORDER — EPINEPHRINE 0.3 MG/.3ML
0.3 INJECTION SUBCUTANEOUS EVERY 5 MIN PRN
OUTPATIENT
Start: 2025-03-20

## 2025-03-19 RX ORDER — ALBUTEROL SULFATE 0.83 MG/ML
3 SOLUTION RESPIRATORY (INHALATION) AS NEEDED
OUTPATIENT
Start: 2025-03-20

## 2025-03-20 LAB — PATH REV-IMMUNOHEMATOLOGY-PR30: NORMAL

## 2025-03-21 ENCOUNTER — TELEPHONE (OUTPATIENT)
Dept: OBSTETRICS AND GYNECOLOGY | Facility: HOSPITAL | Age: 20
End: 2025-03-21

## 2025-03-24 ENCOUNTER — ROUTINE PRENATAL (OUTPATIENT)
Dept: OBSTETRICS AND GYNECOLOGY | Facility: CLINIC | Age: 20
End: 2025-03-24
Payer: COMMERCIAL

## 2025-03-24 VITALS
SYSTOLIC BLOOD PRESSURE: 137 MMHG | DIASTOLIC BLOOD PRESSURE: 78 MMHG | BODY MASS INDEX: 32.09 KG/M2 | WEIGHT: 230.1 LBS | HEART RATE: 98 BPM

## 2025-03-24 DIAGNOSIS — O99.019 IRON DEFICIENCY ANEMIA DURING PREGNANCY: ICD-10-CM

## 2025-03-24 DIAGNOSIS — Z3A.36 36 WEEKS GESTATION OF PREGNANCY (HHS-HCC): Primary | ICD-10-CM

## 2025-03-24 DIAGNOSIS — Z59.819 HOUSING INSECURITY: ICD-10-CM

## 2025-03-24 DIAGNOSIS — D50.9 IRON DEFICIENCY ANEMIA DURING PREGNANCY: ICD-10-CM

## 2025-03-24 DIAGNOSIS — J45.909 ASTHMA AFFECTING PREGNANCY, ANTEPARTUM (HHS-HCC): ICD-10-CM

## 2025-03-24 DIAGNOSIS — O99.519 ASTHMA AFFECTING PREGNANCY, ANTEPARTUM (HHS-HCC): ICD-10-CM

## 2025-03-24 PROCEDURE — 99213 OFFICE O/P EST LOW 20 MIN: CPT

## 2025-03-24 PROCEDURE — 99213 OFFICE O/P EST LOW 20 MIN: CPT | Mod: GC,TH

## 2025-03-24 RX ORDER — EPINEPHRINE 0.3 MG/.3ML
0.3 INJECTION SUBCUTANEOUS EVERY 5 MIN PRN
OUTPATIENT
Start: 2025-03-24

## 2025-03-24 RX ORDER — FAMOTIDINE 10 MG/ML
20 INJECTION, SOLUTION INTRAVENOUS ONCE AS NEEDED
OUTPATIENT
Start: 2025-03-24

## 2025-03-24 RX ORDER — DIPHENHYDRAMINE HYDROCHLORIDE 50 MG/ML
50 INJECTION, SOLUTION INTRAMUSCULAR; INTRAVENOUS AS NEEDED
OUTPATIENT
Start: 2025-03-24

## 2025-03-24 RX ORDER — HEPARIN SODIUM,PORCINE/PF 10 UNIT/ML
50 SYRINGE (ML) INTRAVENOUS AS NEEDED
OUTPATIENT
Start: 2025-03-24

## 2025-03-24 RX ORDER — ALBUTEROL SULFATE 0.83 MG/ML
3 SOLUTION RESPIRATORY (INHALATION) AS NEEDED
OUTPATIENT
Start: 2025-03-24

## 2025-03-24 RX ORDER — HEPARIN 100 UNIT/ML
500 SYRINGE INTRAVENOUS AS NEEDED
OUTPATIENT
Start: 2025-03-24

## 2025-03-24 SDOH — ECONOMIC STABILITY - HOUSING INSECURITY: HOUSING INSTABILITY UNSPECIFIED: Z59.819

## 2025-03-24 NOTE — PROGRESS NOTES
"Subjective   Patient ID [id number]   Juilet Kwok is a 19 y.o.  at 36w0d with a working estimated date of delivery of 2025, by Last Menstrual Period who presents for a routine prenatal visit. She denies vaginal bleeding, leakage of fluid, decreased fetal movements, or contractions.    Her pregnancy is complicated by:  - Anemia: last Hb 8.9, ferritin 23. Hb not improving with PO Fe. IV Fe ordered but not yet done or scheduled.  - Asthma: symbicort + albuterol rx'd. Hasn't been taking albuterol, only the symbicort. Feeling some wheezing intermittently. No CP. No SOB currently.  - Hx of housing insecurity this preg, but not has new apt, going well    Requests cervical check.    Objective   Physical Exam:   Weight: 104 kg (230 lb 1.6 oz)  Expected Total Weight Gain: 11.5 kg (25 lb)-16 kg (35 lb)   Pregravid BMI: 22.33  BP: 137/78  Fetal Heart Rate: 153             SVE 0/50/-3    Prenatal Labs  Urine Dip:  No results found for: \"KETONESU\", \"GLUCOSEUR\", \"LEUKOCYTESUR\"  Lab Results   Component Value Date    HGB 8.9 (L) 2025    HCT 28.2 (L) 2025    ABO B 2025    HEPBSAG Nonreactive 10/07/2024         Assessment/Plan   Problem List Items Addressed This Visit             ICD-10-CM    36 weeks gestation of pregnancy ([facility]) - Primary Z3A.36     - Collected GBS  - Cervix checked per pt request. Closed. Discussed s/s of labor, when to present to L&D.  - To schedule 39wk IOL at next visit         Relevant Orders    Group B Streptococcus (GBS) Prenatal Screen, Culture    Asthma affecting pregnancy, antepartum ([facility]) O99.519, J45.909     - Taking symbicort but not albuterol because pt thought she had to stop it when starting symbicort  - Advised she can take albuterol prn for symptoms         Housing insecurity Z59.819     - Now has stable housing  - seeing benita kuo, FFL referral placed today         Relevant Orders    Referral to [facility]    Iron deficiency anemia during pregnancy " ([facility]) O99.019, D50.9     - last Hb 8.9, ferritin 23. Hb not improving with PO Fe.  - Scheduled IV Fe during visit today for tomorrow 3/25 at 0830            Follow up in 1 week for a routine prenatal visit.    Pt seen and d/w Dr. Taylor Wood MD PGY-3

## 2025-03-24 NOTE — ASSESSMENT & PLAN NOTE
- Taking symbicort but not albuterol because pt thought she had to stop it when starting symbicort  - Advised she can take albuterol prn for symptoms

## 2025-03-27 LAB — GP B STREP SPEC QL CULT: NORMAL

## 2025-03-28 ENCOUNTER — APPOINTMENT (OUTPATIENT)
Dept: OBSTETRICS AND GYNECOLOGY | Facility: CLINIC | Age: 20
End: 2025-03-28
Payer: COMMERCIAL

## 2025-03-31 ENCOUNTER — ROUTINE PRENATAL (OUTPATIENT)
Dept: OBSTETRICS AND GYNECOLOGY | Facility: CLINIC | Age: 20
End: 2025-03-31
Payer: COMMERCIAL

## 2025-03-31 VITALS
DIASTOLIC BLOOD PRESSURE: 73 MMHG | HEART RATE: 93 BPM | BODY MASS INDEX: 32.36 KG/M2 | SYSTOLIC BLOOD PRESSURE: 123 MMHG | WEIGHT: 232 LBS

## 2025-03-31 DIAGNOSIS — O99.019 IRON DEFICIENCY ANEMIA DURING PREGNANCY: ICD-10-CM

## 2025-03-31 DIAGNOSIS — Z3A.37 37 WEEKS GESTATION OF PREGNANCY (HHS-HCC): Primary | ICD-10-CM

## 2025-03-31 DIAGNOSIS — O99.519 ASTHMA AFFECTING PREGNANCY, ANTEPARTUM (HHS-HCC): ICD-10-CM

## 2025-03-31 DIAGNOSIS — Z67.91 RH NEGATIVE STATE IN ANTEPARTUM PERIOD (HHS-HCC): ICD-10-CM

## 2025-03-31 DIAGNOSIS — D50.9 IRON DEFICIENCY ANEMIA DURING PREGNANCY: ICD-10-CM

## 2025-03-31 DIAGNOSIS — J45.909 ASTHMA AFFECTING PREGNANCY, ANTEPARTUM (HHS-HCC): ICD-10-CM

## 2025-03-31 DIAGNOSIS — O26.899 RH NEGATIVE STATE IN ANTEPARTUM PERIOD (HHS-HCC): ICD-10-CM

## 2025-03-31 PROCEDURE — 99213 OFFICE O/P EST LOW 20 MIN: CPT | Mod: GE,TH

## 2025-03-31 PROCEDURE — 99213 OFFICE O/P EST LOW 20 MIN: CPT

## 2025-03-31 ASSESSMENT — PAIN SCALES - GENERAL: PAINLEVEL_OUTOF10: 0 - NO PAIN

## 2025-03-31 NOTE — ASSESSMENT & PLAN NOTE
- Most recent Hgb. 8.9, Ferritin of 23   - For IV Fe, previously no-showed appt. Needs help re-scheduling. Will task RN.

## 2025-03-31 NOTE — ASSESSMENT & PLAN NOTE
- Desires IOL- will message for 39wk scheduling   - For postpartum Nexplanon  - Desires to breast and formula feed- has pump and all necessary baby supplies

## 2025-03-31 NOTE — PROGRESS NOTES
Ob Follow-up  25     SUBJECTIVE    HPI: Juliet Kwok is a 19 y.o.  at 37w0d here for RPNV.  She reports BH contractions; denies vaginal bleeding. Had one episode of increased clear vaginal discharge but no LOF. Reports active FM. No concerns today, she is ready to meet her baby boy.    OBJECTIVE  Visit Vitals  /73   Pulse 93   Wt 105 kg (232 lb)   LMP 07/15/2024 (Exact Date)   BMI 32.36 kg/m²   OB Status Pregnant   Smoking Status Never   BSA 2.29 m²        ASSESSMENT & PLAN    Juliet Kwok is a 19 y.o.  at 37w0d here for the following concerns we addressed today:    37 weeks gestation of pregnancy ([facility])  - Desires IOL- will message for 39wk scheduling   - For postpartum Nexplanon  - Desires to breast and formula feed- has pump and all necessary baby supplies       Rh negative state in antepartum period ([facility])  - s/p Rhogam     Asthma affecting pregnancy, antepartum ([facility])  - Continue Symbicort, Albuterol PRN    Iron deficiency anemia during pregnancy ([facility])  - Most recent Hgb. 8.9, Ferritin of 23   - For IV Fe, previously no-showed appt. Needs help re-scheduling. Will task RN.        RTC in 1 week    Seen & d/w SARAH Farley. Nathalie Ko MD  PGY-II, Obstetrics & Gynecology   [facility]

## 2025-04-03 ENCOUNTER — HOSPITAL ENCOUNTER (OUTPATIENT)
Facility: HOSPITAL | Age: 20
Discharge: HOME | End: 2025-04-04
Attending: SPECIALIST | Admitting: SPECIALIST
Payer: COMMERCIAL

## 2025-04-03 LAB
BILIRUBIN, POC: NEGATIVE
BLOOD URINE, POC: POSITIVE
CLARITY, POC: CLEAR
COLOR, POC: YELLOW
GLUCOSE URINE, POC: NEGATIVE
KETONES, POC: POSITIVE
LEUKOCYTE EST, POC: NORMAL
NITRITE, POC: NEGATIVE
PH, POC: 6.5
POC APPEARANCE OF BODY FLUID: NORMAL
SPECIFIC GRAVITY, POC: 1.02
URINE PROTEIN, POC: NORMAL
UROBILINOGEN, POC: 1

## 2025-04-03 PROCEDURE — 4500999001 HC ED NO CHARGE

## 2025-04-03 PROCEDURE — 93005 ELECTROCARDIOGRAM TRACING: CPT | Mod: 59

## 2025-04-03 PROCEDURE — 2500000001 HC RX 250 WO HCPCS SELF ADMINISTERED DRUGS (ALT 637 FOR MEDICARE OP): Mod: SE

## 2025-04-03 PROCEDURE — 2500000004 HC RX 250 GENERAL PHARMACY W/ HCPCS (ALT 636 FOR OP/ED): Mod: SE

## 2025-04-03 PROCEDURE — 93010 ELECTROCARDIOGRAM REPORT: CPT | Performed by: STUDENT IN AN ORGANIZED HEALTH CARE EDUCATION/TRAINING PROGRAM

## 2025-04-03 PROCEDURE — 59025 FETAL NON-STRESS TEST: CPT | Mod: GC

## 2025-04-03 RX ORDER — ONDANSETRON 4 MG/1
4 TABLET, FILM COATED ORAL EVERY 6 HOURS PRN
Status: DISCONTINUED | OUTPATIENT
Start: 2025-04-03 | End: 2025-04-04 | Stop reason: HOSPADM

## 2025-04-03 RX ORDER — METOCLOPRAMIDE HYDROCHLORIDE 5 MG/ML
10 INJECTION INTRAMUSCULAR; INTRAVENOUS EVERY 6 HOURS PRN
Status: DISCONTINUED | OUTPATIENT
Start: 2025-04-03 | End: 2025-04-04 | Stop reason: HOSPADM

## 2025-04-03 RX ORDER — ONDANSETRON HYDROCHLORIDE 2 MG/ML
4 INJECTION, SOLUTION INTRAVENOUS EVERY 6 HOURS PRN
Status: DISCONTINUED | OUTPATIENT
Start: 2025-04-03 | End: 2025-04-04 | Stop reason: HOSPADM

## 2025-04-03 RX ORDER — LABETALOL HYDROCHLORIDE 5 MG/ML
20 INJECTION, SOLUTION INTRAVENOUS ONCE AS NEEDED
Status: DISCONTINUED | OUTPATIENT
Start: 2025-04-03 | End: 2025-04-04 | Stop reason: HOSPADM

## 2025-04-03 RX ORDER — ACETAMINOPHEN 325 MG/1
975 TABLET ORAL ONCE
Status: COMPLETED | OUTPATIENT
Start: 2025-04-03 | End: 2025-04-03

## 2025-04-03 RX ORDER — HYDRALAZINE HYDROCHLORIDE 20 MG/ML
5 INJECTION INTRAMUSCULAR; INTRAVENOUS ONCE AS NEEDED
Status: DISCONTINUED | OUTPATIENT
Start: 2025-04-03 | End: 2025-04-04 | Stop reason: HOSPADM

## 2025-04-03 RX ORDER — CYCLOBENZAPRINE HCL 10 MG
10 TABLET ORAL ONCE
Status: COMPLETED | OUTPATIENT
Start: 2025-04-03 | End: 2025-04-03

## 2025-04-03 RX ORDER — LIDOCAINE HYDROCHLORIDE 10 MG/ML
0.5 INJECTION, SOLUTION INFILTRATION; PERINEURAL ONCE AS NEEDED
Status: DISCONTINUED | OUTPATIENT
Start: 2025-04-03 | End: 2025-04-04 | Stop reason: HOSPADM

## 2025-04-03 RX ORDER — METOCLOPRAMIDE 10 MG/1
10 TABLET ORAL EVERY 6 HOURS PRN
Status: DISCONTINUED | OUTPATIENT
Start: 2025-04-03 | End: 2025-04-04 | Stop reason: HOSPADM

## 2025-04-03 RX ADMIN — CYCLOBENZAPRINE HYDROCHLORIDE 10 MG: 10 TABLET, FILM COATED ORAL at 23:40

## 2025-04-03 RX ADMIN — ONDANSETRON HYDROCHLORIDE 4 MG: 4 TABLET, FILM COATED ORAL at 23:40

## 2025-04-03 RX ADMIN — ACETAMINOPHEN 975 MG: 325 TABLET ORAL at 23:40

## 2025-04-03 SDOH — HEALTH STABILITY: MENTAL HEALTH: HAVE YOU USED ANY SUBSTANCES (CANABIS, COCAINE, HEROIN, HALLUCINOGENS, INHALANTS, ETC.) IN THE PAST 12 MONTHS?: NO

## 2025-04-03 SDOH — HEALTH STABILITY: MENTAL HEALTH: SUICIDAL BEHAVIOR (LIFETIME): NO

## 2025-04-03 SDOH — SOCIAL STABILITY: SOCIAL INSECURITY: ABUSE SCREEN: ADULT

## 2025-04-03 SDOH — SOCIAL STABILITY: SOCIAL INSECURITY: HAVE YOU HAD THOUGHTS OF HARMING ANYONE ELSE?: NO

## 2025-04-03 SDOH — HEALTH STABILITY: MENTAL HEALTH: WERE YOU ABLE TO COMPLETE ALL THE BEHAVIORAL HEALTH SCREENINGS?: YES

## 2025-04-03 SDOH — HEALTH STABILITY: MENTAL HEALTH: NON-SPECIFIC ACTIVE SUICIDAL THOUGHTS (PAST 1 MONTH): NO

## 2025-04-03 SDOH — SOCIAL STABILITY: SOCIAL INSECURITY: ARE THERE ANY APPARENT SIGNS OF INJURIES/BEHAVIORS THAT COULD BE RELATED TO ABUSE/NEGLECT?: NO

## 2025-04-03 SDOH — SOCIAL STABILITY: SOCIAL INSECURITY: HAVE YOU HAD ANY THOUGHTS OF HARMING ANYONE ELSE?: NO

## 2025-04-03 SDOH — SOCIAL STABILITY: SOCIAL INSECURITY: ARE YOU OR HAVE YOU BEEN THREATENED OR ABUSED PHYSICALLY, EMOTIONALLY, OR SEXUALLY BY ANYONE?: NO

## 2025-04-03 SDOH — SOCIAL STABILITY: SOCIAL INSECURITY: PHYSICAL ABUSE: YES, PAST (COMMENT)

## 2025-04-03 SDOH — SOCIAL STABILITY: SOCIAL INSECURITY: DOES ANYONE TRY TO KEEP YOU FROM HAVING/CONTACTING OTHER FRIENDS OR DOING THINGS OUTSIDE YOUR HOME?: NO

## 2025-04-03 SDOH — SOCIAL STABILITY: SOCIAL INSECURITY: DO YOU FEEL ANYONE HAS EXPLOITED OR TAKEN ADVANTAGE OF YOU FINANCIALLY OR OF YOUR PERSONAL PROPERTY?: NO

## 2025-04-03 SDOH — SOCIAL STABILITY: SOCIAL INSECURITY: VERBAL ABUSE: YES, PAST (COMMENT)

## 2025-04-03 SDOH — HEALTH STABILITY: MENTAL HEALTH: HAVE YOU USED ANY PRESCRIPTION DRUGS OTHER THAN PRESCRIBED IN THE PAST 12 MONTHS?: NO

## 2025-04-03 SDOH — SOCIAL STABILITY: SOCIAL INSECURITY: HAS ANYONE EVER THREATENED TO HURT YOUR FAMILY OR YOUR PETS?: NO

## 2025-04-03 SDOH — ECONOMIC STABILITY: HOUSING INSECURITY: DO YOU FEEL UNSAFE GOING BACK TO THE PLACE WHERE YOU ARE LIVING?: NO

## 2025-04-03 SDOH — HEALTH STABILITY: MENTAL HEALTH: WISH TO BE DEAD (PAST 1 MONTH): NO

## 2025-04-03 ASSESSMENT — LIFESTYLE VARIABLES
HOW OFTEN DO YOU HAVE A DRINK CONTAINING ALCOHOL: NEVER
AUDIT-C TOTAL SCORE: 0
HOW MANY STANDARD DRINKS CONTAINING ALCOHOL DO YOU HAVE ON A TYPICAL DAY: PATIENT DOES NOT DRINK
HOW OFTEN DO YOU HAVE 6 OR MORE DRINKS ON ONE OCCASION: NEVER
AUDIT-C TOTAL SCORE: 0
SKIP TO QUESTIONS 9-10: 1

## 2025-04-03 ASSESSMENT — PATIENT HEALTH QUESTIONNAIRE - PHQ9
1. LITTLE INTEREST OR PLEASURE IN DOING THINGS: NOT AT ALL
SUM OF ALL RESPONSES TO PHQ9 QUESTIONS 1 & 2: 0
2. FEELING DOWN, DEPRESSED OR HOPELESS: NOT AT ALL

## 2025-04-03 ASSESSMENT — PAIN SCALES - GENERAL: PAINLEVEL_OUTOF10: 5 - MODERATE PAIN

## 2025-04-03 ASSESSMENT — ACTIVITIES OF DAILY LIVING (ADL): LACK_OF_TRANSPORTATION: NO

## 2025-04-04 ENCOUNTER — APPOINTMENT (OUTPATIENT)
Dept: OBSTETRICS AND GYNECOLOGY | Facility: HOSPITAL | Age: 20
End: 2025-04-04
Payer: COMMERCIAL

## 2025-04-04 VITALS
RESPIRATION RATE: 16 BRPM | BODY MASS INDEX: 33.02 KG/M2 | WEIGHT: 235.89 LBS | TEMPERATURE: 97.7 F | HEIGHT: 71 IN | OXYGEN SATURATION: 98 % | HEART RATE: 96 BPM | SYSTOLIC BLOOD PRESSURE: 102 MMHG | DIASTOLIC BLOOD PRESSURE: 50 MMHG

## 2025-04-04 PROCEDURE — 99215 OFFICE O/P EST HI 40 MIN: CPT

## 2025-04-04 ASSESSMENT — PAIN SCALES - GENERAL: PAINLEVEL_OUTOF10: 0 - NO PAIN

## 2025-04-04 NOTE — H&P
OB Triage H&P    Assessment/Plan    Juliet Kwok is a 19 y.o.  at 37w3d here for contractions. Has been feeling contractions every 6 minutes for an hour.    R/o Labor  - Contractions q6-9 min on TOCO, palpated mild to moderate  - closed on cervical exam  - Patient tachycardic during contractions, but asymptomatic and EKG with sinus tachycardia. In low 100s to 110s between contractions  - Tylenol, Flexeril, and hot packs provided with improvement in comfort  - Discussed return precautions of contractions q5min for 1-2 hours  - Patient lives 10min from hospital and feels comfortable going home    Fetal Status  -Fetal monitoring reassuring  -Good fetal movement  -Up to date on prenatal care  -Continue routine prenatal care    Dispo  -Patient appropriate for discharge home, agrees with plan  -Return precautions discussed   -Follow up at next scheduled OB appointment or to triage sooner as needed    Discussed with Dr. Lenin Nicole MD, PGY-2      Subjective   20 yo G1 at 37w3d here for contractions. Has been feeling contractions every 6 minutes for an hour. No VB, LOF. Good FM.     Pregnancy notable:  - BEN: last hgb 8s. For IV iron on Monday  - rh neg, s/p rhogam   - asthma    Prenatal Provider [place]ce    OB History    Para Term  AB Living   1 0 0 0 0 0   SAB IAB Ectopic Multiple Live Births   0 0 0 0 0      # Outcome Date GA Lbr Yoseph/2nd Weight Sex Type Anes PTL Lv   1 Current                History reviewed. No pertinent surgical history.    Social History     Tobacco Use    Smoking status: Never    Smokeless tobacco: Never   Substance Use Topics    Alcohol use: Never       No Known Allergies    Medications Prior to Admission   Medication Sig Dispense Refill Last Dose/Taking    albuterol 2.5 mg /3 mL (0.083 %) nebulizer solution Take 3 mL (2.5 mg) by nebulization every 6 hours if needed for wheezing. 75 mL 11 Past Week    albuterol 90 mcg/actuation inhaler Inhale 2 puffs every 6  hours if needed for wheezing. 18 g 11 Past Week    aspirin 81 mg EC tablet Take 2 tablets (162 mg) by mouth once daily. Start after 12 weeks of pregnancy 60 tablet 11 Past Month    budesonide-formoteroL (Symbicort) 160-4.5 mcg/actuation inhaler Inhale 2 puffs 2 times a day. Rinse mouth with water after use to reduce aftertaste and incidence of candidiasis. Do not swallow. 10.2 g 11 4/2/2025    calcium carbonate (Tums) 200 mg calcium chewable tablet Chew 2 tablets (1,000 mg) once daily. 60 tablet 11 Past Month    famotidine (Pepcid) 20 mg tablet Take 1 tablet (20 mg) by mouth 2 times a day. For heartburn 60 tablet 3 Past Month    ferrous sulfate tablet Take 1 tablet (325 mg) by mouth 2 times a day. 60 tablet 1 Past Week    lidocaine (Lidoderm) 5 % patch Place 1 patch over 12 hours on the skin once daily. Remove & discard patch within 12 hours or as directed by MD. Do not place over abdomen. 10 patch 0 Past Month    loratadine (Claritin) 10 mg tablet Take 1 tablet (10 mg) by mouth once daily as needed for allergies.   Past Month    prenatal vitamin, iron-folic, 27 mg iron-800 mcg folic acid tablet Take 1 tablet by mouth once daily. 30 tablet 0 Past Month    docosanol cream (Abreva) 10 % cream cream Apply topically 5 times a day. APPLY AND RUB IN until healed       polyethylene glycol (Glycolax, Miralax) 17 gram packet Take 17 g by mouth once daily. 60 packet 0 Unknown     Objective     Last Vitals  Temp Pulse Resp BP MAP O2 Sat   36.7 °C (98.1 °F) 107 18 132/61 88 (!) 95 %     Blood Pressures         4/3/2025  2228             BP: 132/61             Physical Exam  General: NAD, mood appropriate  Cardiopulmonary: warm and well perfused, breathing comfortably on room air  Abdomen: Gravid, non-tender.   Extremities: Symmetric  Cervix: 0/0/-3     Chaperone Present: Yes.  Chaperone Name/Title: Rita Stanley  Examination Chaperoned: Gynecological Exam     Fetal Monitoring  Baseline: 140 bpm, Variability: moderate,   Accelerations: present and Decelerations: none  Uterine Activity: q7-9 minutes  Interpretation: Reactive    Bedside ultrasound: No    Labs in chart were reviewed.

## 2025-04-07 ENCOUNTER — ROUTINE PRENATAL (OUTPATIENT)
Dept: OBSTETRICS AND GYNECOLOGY | Facility: CLINIC | Age: 20
End: 2025-04-07
Payer: COMMERCIAL

## 2025-04-07 ENCOUNTER — HOSPITAL ENCOUNTER (OUTPATIENT)
Facility: HOSPITAL | Age: 20
Discharge: HOME | End: 2025-04-07
Attending: SPECIALIST | Admitting: SPECIALIST
Payer: COMMERCIAL

## 2025-04-07 VITALS — BODY MASS INDEX: 33.46 KG/M2 | WEIGHT: 239.8 LBS | SYSTOLIC BLOOD PRESSURE: 126 MMHG | DIASTOLIC BLOOD PRESSURE: 76 MMHG

## 2025-04-07 VITALS
BODY MASS INDEX: 33.83 KG/M2 | WEIGHT: 241.62 LBS | HEIGHT: 71 IN | OXYGEN SATURATION: 97 % | DIASTOLIC BLOOD PRESSURE: 59 MMHG | HEART RATE: 105 BPM | TEMPERATURE: 99.5 F | RESPIRATION RATE: 16 BRPM | SYSTOLIC BLOOD PRESSURE: 126 MMHG

## 2025-04-07 DIAGNOSIS — Z3A.38 38 WEEKS GESTATION OF PREGNANCY (HHS-HCC): ICD-10-CM

## 2025-04-07 DIAGNOSIS — Z34.03 ENCOUNTER FOR SUPERVISION OF NORMAL PRIMIGRAVIDA IN THIRD TRIMESTER, ANTEPARTUM: Primary | ICD-10-CM

## 2025-04-07 DIAGNOSIS — D50.9 IRON DEFICIENCY ANEMIA DURING PREGNANCY: ICD-10-CM

## 2025-04-07 DIAGNOSIS — O99.019 IRON DEFICIENCY ANEMIA DURING PREGNANCY: ICD-10-CM

## 2025-04-07 PROCEDURE — 99214 OFFICE O/P EST MOD 30 MIN: CPT | Mod: 25

## 2025-04-07 PROCEDURE — 0501F PRENATAL FLOW SHEET: CPT | Performed by: NURSE PRACTITIONER

## 2025-04-07 PROCEDURE — 59025 FETAL NON-STRESS TEST: CPT | Performed by: OBSTETRICS & GYNECOLOGY

## 2025-04-07 PROCEDURE — 99214 OFFICE O/P EST MOD 30 MIN: CPT | Mod: 25,TH | Performed by: NURSE PRACTITIONER

## 2025-04-07 PROCEDURE — 2500000004 HC RX 250 GENERAL PHARMACY W/ HCPCS (ALT 636 FOR OP/ED): Mod: SE

## 2025-04-07 RX ADMIN — IRON SUCROSE 200 MG: 20 INJECTION, SOLUTION INTRAVENOUS at 21:48

## 2025-04-07 SDOH — SOCIAL STABILITY: SOCIAL INSECURITY: HAS ANYONE EVER THREATENED TO HURT YOUR FAMILY OR YOUR PETS?: NO

## 2025-04-07 SDOH — ECONOMIC STABILITY: TRANSPORTATION INSECURITY: IN THE PAST 12 MONTHS, HAS LACK OF TRANSPORTATION KEPT YOU FROM MEDICAL APPOINTMENTS OR FROM GETTING MEDICATIONS?: NO

## 2025-04-07 SDOH — SOCIAL STABILITY: SOCIAL INSECURITY
WITHIN THE LAST YEAR, HAVE YOU BEEN RAPED OR FORCED TO HAVE ANY KIND OF SEXUAL ACTIVITY BY YOUR PARTNER OR EX-PARTNER?: NO

## 2025-04-07 SDOH — SOCIAL STABILITY: SOCIAL INSECURITY: WITHIN THE LAST YEAR, HAVE YOU BEEN HUMILIATED OR EMOTIONALLY ABUSED IN OTHER WAYS BY YOUR PARTNER OR EX-PARTNER?: NO

## 2025-04-07 SDOH — SOCIAL STABILITY: SOCIAL INSECURITY: HAVE YOU HAD THOUGHTS OF HARMING ANYONE ELSE?: NO

## 2025-04-07 SDOH — SOCIAL STABILITY: SOCIAL INSECURITY: DO YOU FEEL ANYONE HAS EXPLOITED OR TAKEN ADVANTAGE OF YOU FINANCIALLY OR OF YOUR PERSONAL PROPERTY?: NO

## 2025-04-07 SDOH — HEALTH STABILITY: MENTAL HEALTH: WERE YOU ABLE TO COMPLETE ALL THE BEHAVIORAL HEALTH SCREENINGS?: YES

## 2025-04-07 SDOH — ECONOMIC STABILITY: HOUSING INSECURITY: DO YOU FEEL UNSAFE GOING BACK TO THE PLACE WHERE YOU ARE LIVING?: NO

## 2025-04-07 SDOH — SOCIAL STABILITY: SOCIAL INSECURITY: ABUSE SCREEN: ADULT

## 2025-04-07 SDOH — HEALTH STABILITY: MENTAL HEALTH: SUICIDAL BEHAVIOR (LIFETIME): NO

## 2025-04-07 SDOH — SOCIAL STABILITY: SOCIAL INSECURITY: PHYSICAL ABUSE: DENIES

## 2025-04-07 SDOH — ECONOMIC STABILITY: FOOD INSECURITY: HOW HARD IS IT FOR YOU TO PAY FOR THE VERY BASICS LIKE FOOD, HOUSING, MEDICAL CARE, AND HEATING?: NOT HARD AT ALL

## 2025-04-07 SDOH — ECONOMIC STABILITY: FOOD INSECURITY: WITHIN THE PAST 12 MONTHS, YOU WORRIED THAT YOUR FOOD WOULD RUN OUT BEFORE YOU GOT THE MONEY TO BUY MORE.: NEVER TRUE

## 2025-04-07 SDOH — HEALTH STABILITY: MENTAL HEALTH: WISH TO BE DEAD (PAST 1 MONTH): NO

## 2025-04-07 SDOH — SOCIAL STABILITY: SOCIAL INSECURITY
WITHIN THE LAST YEAR, HAVE YOU BEEN KICKED, HIT, SLAPPED, OR OTHERWISE PHYSICALLY HURT BY YOUR PARTNER OR EX-PARTNER?: NO

## 2025-04-07 SDOH — ECONOMIC STABILITY: FOOD INSECURITY: WITHIN THE PAST 12 MONTHS, THE FOOD YOU BOUGHT JUST DIDN'T LAST AND YOU DIDN'T HAVE MONEY TO GET MORE.: NEVER TRUE

## 2025-04-07 SDOH — SOCIAL STABILITY: SOCIAL INSECURITY: WITHIN THE LAST YEAR, HAVE YOU BEEN AFRAID OF YOUR PARTNER OR EX-PARTNER?: NO

## 2025-04-07 SDOH — SOCIAL STABILITY: SOCIAL INSECURITY: VERBAL ABUSE: DENIES

## 2025-04-07 SDOH — SOCIAL STABILITY: SOCIAL INSECURITY: ARE THERE ANY APPARENT SIGNS OF INJURIES/BEHAVIORS THAT COULD BE RELATED TO ABUSE/NEGLECT?: NO

## 2025-04-07 SDOH — SOCIAL STABILITY: SOCIAL INSECURITY: HAVE YOU HAD ANY THOUGHTS OF HARMING ANYONE ELSE?: NO

## 2025-04-07 SDOH — HEALTH STABILITY: MENTAL HEALTH: NON-SPECIFIC ACTIVE SUICIDAL THOUGHTS (PAST 1 MONTH): NO

## 2025-04-07 SDOH — SOCIAL STABILITY: SOCIAL INSECURITY: DOES ANYONE TRY TO KEEP YOU FROM HAVING/CONTACTING OTHER FRIENDS OR DOING THINGS OUTSIDE YOUR HOME?: NO

## 2025-04-07 SDOH — SOCIAL STABILITY: SOCIAL INSECURITY: ARE YOU OR HAVE YOU BEEN THREATENED OR ABUSED PHYSICALLY, EMOTIONALLY, OR SEXUALLY BY ANYONE?: NO

## 2025-04-07 ASSESSMENT — ENCOUNTER SYMPTOMS
RESPIRATORY NEGATIVE: 0
NEUROLOGICAL NEGATIVE: 0
PSYCHIATRIC NEGATIVE: 0
CARDIOVASCULAR NEGATIVE: 0
DEPRESSION: 0
HEMATOLOGIC/LYMPHATIC NEGATIVE: 0
CONSTITUTIONAL NEGATIVE: 0
ENDOCRINE NEGATIVE: 0
GASTROINTESTINAL NEGATIVE: 0
OCCASIONAL FEELINGS OF UNSTEADINESS: 0
ALLERGIC/IMMUNOLOGIC NEGATIVE: 0
MUSCULOSKELETAL NEGATIVE: 0
LOSS OF SENSATION IN FEET: 0
EYES NEGATIVE: 0

## 2025-04-07 ASSESSMENT — LIFESTYLE VARIABLES
SKIP TO QUESTIONS 9-10: 1
AUDIT-C TOTAL SCORE: 0
AUDIT-C TOTAL SCORE: 0
HOW OFTEN DO YOU HAVE 6 OR MORE DRINKS ON ONE OCCASION: NEVER
HOW MANY STANDARD DRINKS CONTAINING ALCOHOL DO YOU HAVE ON A TYPICAL DAY: PATIENT DOES NOT DRINK
HOW OFTEN DO YOU HAVE A DRINK CONTAINING ALCOHOL: NEVER

## 2025-04-07 ASSESSMENT — PATIENT HEALTH QUESTIONNAIRE - PHQ9
1. LITTLE INTEREST OR PLEASURE IN DOING THINGS: NOT AT ALL
2. FEELING DOWN, DEPRESSED OR HOPELESS: NOT AT ALL
1. LITTLE INTEREST OR PLEASURE IN DOING THINGS: NOT AT ALL
2. FEELING DOWN, DEPRESSED OR HOPELESS: NOT AT ALL
SUM OF ALL RESPONSES TO PHQ9 QUESTIONS 1 & 2: 0
SUM OF ALL RESPONSES TO PHQ9 QUESTIONS 1 AND 2: 0

## 2025-04-07 ASSESSMENT — ACTIVITIES OF DAILY LIVING (ADL): LACK_OF_TRANSPORTATION: NO

## 2025-04-07 ASSESSMENT — PAIN SCALES - GENERAL: PAINLEVEL_OUTOF10: 7

## 2025-04-07 NOTE — PATIENT INSTRUCTIONS
Labor Induction vs. Expectant Management  This handout was made to help you make the best choice on how to proceed after 39 weeks with factual, evidenced based information. The mass media is not a good source of information about induction of labor or birth in general.  It is always a good idea to get informed before you decide what is right for you.    If there is not a medical reason to induce your labor, you have 2 choices:    Expectant Management - which means to wait for labor to start on its own.  Most labors will start on their own by 41 weeks. If you decide to wait and labor does not start by 41 weeks - it is suggested to have an ultrasound to check the amniotic fluid level around the baby and to assess fetal well-being.  This is called a BPP (biophysical profile) and a non-stress test (NST).  Induction of Labor - the process of starting labor before natural labor begins (also called “risk reducing induction). This option can be considered as early as 39 weeks (one week before your due date). Based on a research study called “The Arrive Trial”, there was a decreased in hypertensive disorders for the mother, less  intensive care admissions, a slightly higher rate of vaginal birth versus  section.  It also showed a decrease in stillbirth.    * It is important to know that if you are choosing an induction, that your labor will be actively managed (meaning the use of medication and other interventions during labor)    We trust your body to go into spontaneous labor, but also support either decision you make for your birth.      Your cervix  As you wait for the arrival of your baby, your body is hard at work getting ready for labor and birth.  We look at five important factors when assessing your cervix to check its readiness for labor or to make a decision about the method of induction.  This is called a Verdugo Score.      Dilation (how dilated)  Effacement (how thin)  Station (where baby's head  is)  Consistency (how soft)  Position of cervix    This score can help us to determine your body's readiness for labor and is used as a tool to aid the midwife in selecting the best method to start an induction.      Methods of Labor Induction  An induction of labor can include a combination of any of the following methods:  _______________________________________________________________________________________________________________________________  Cervical Ripening   Ripening is the process of softening, thinning (effacing), and opening (dilating) the cervix. Your provider will use either medicine or mechanical techniques, or a combination of both, during this phase.     Medicine:  The most commonly used medication is Cytotec® (misoprostol). Your provider will insert it as a tablet into your vagina. This can be done every 3 hours until the cervix is about 3-4 cm dilated.    Prostaglandin (hormone) that softens and thins the cervix, may cause contractions  Has been used for approximately 25 years for labor induction  Cannot be used if you have had a prior  or surgery on your uterus  Can sometimes cause excessive uterine contractions (a medication called Terbutaline can be used to slow down the contractions)        Mechanical - Cervical Ripening Balloon:   A cervical ripening balloon is a device that causes the cervix to release natural prostaglandin hormones   A thin, flexible catheter is placed through the cervix using either a speculum or a digital exam  A small balloon at the end of the device is filled with water - this puts gentle, constant pressure on the cervix causing the release nature prostaglandins that help ripen the cervix.  As your cervix is opening, eventually the balloon will fall out; or the balloon can be deflated and removed if still in after 12  hours        _______________________________________________________________________________________________________________________________  Pitocin®  Pitocin® is a medication version of oxytocin - a hormone in your body that causes contractions to begin    Given through an IV (into the vein) line in your arm.   Started at a low dose - rate will be gradually increased every 30 minutes until contractions are occurring about every 2-3 minutes  Contractions help your cervix thin and dilate  Most patients become aware of their contractions about 30 to 60 minutes after starting Pitocin® -  you may feel the contractions as mild, period-like cramping that usually becomes stronger and more frequent as labor progresses     _______________________________________________________________________________________________________________________________  Rupture of Membranes (water breaking)  Rupture of membranes means that the bag containing the amniotic fluid around the baby has broken.     To break your bag of water, your cervix needs to be at least 2-3 cm dilated and baby's head needs to be well applied to your cervix.    Your provider will insert a tiny plastic hook into your vagina during a cervical exam to break your water - you may feel the amniotic fluid running out as the membranes break.   Your contractions are likely to become stronger and closer together, helping your labor progress      _______________________________________________________________________________________________________________________________    What will my induction look like?  If you desire an induction of labor for risk reduction, an appointment will be scheduled on the labor and delivery unit (L&D) for a date and time as early as 39 weeks (1 week prior to your due date).    Date and time of induction is subject to change or may be postponed depending on how busy L&D is or availability of nurse staffing.   Please be sure to eat a healthy  meal before you come in for your labor induction unless your care team tells you not to.        First labor and/or cervix = 1 cm or less       Prior vaginal delivery and/or first labor cervix = 2-3 cm          You and your baby will be closely monitored throughout all phases of labor. You are encouraged to change positions and move around out of the bed. Cervical exam frequency will depend on your individual labor progress. Progress means your cervix continues to dilate and efface and/or the baby is moving lower into your pelvis. The duration of labor depends on how long it takes your cervix to thin and open, and when contractions begin.     If you and your baby are doing well,          the goal is to be patient and make every effort to have a vaginal birth.    If your cervix is dilated less than 6 centimeters, the goal is to allow at least 12 to 18 hours after rupture of membranes and being on Pitocin® to progress into active labor before we consider discussing a . If labor does not progress at this stage, it is called “failed induction of labor.”      If your cervix is dilated 6 centimeters or more, the goal is to have strong, effective contractions and for labor to progress at least every 4 to 6 hours before we consider discussing a . If there are health and safety concerns about you or your baby, your care team may recommend a . If labor progress has stalled at this stage, it is called “arrest of active labor”.     If you have any questions at any time about your labor, please ask. A safe delivery is a team effort and you are a valuable member of the team. Our goal is to include you in decisions about your care.     Pain Management in Labor

## 2025-04-07 NOTE — PROGRESS NOTES
Subjective   Juliet Kwok is a 19 y.o.  at 38w0d with a working estimated date of delivery of 2025, by Last Menstrual Period who presents for a routine prenatal visit.     Patient reports intermittent [place]terson contractions. Requesting CE. She denies vaginal bleeding, leakage of fluid, decreased fetal movements, or contractions. Patient reports she was unable to attend IV iron appointment d/t car trouble.     Objective   Physical Exam:   Weight: 109 kg (239 lb 12.8 oz)  TW.2 kg (79 lb 12.8 oz)  BP: 126/76  Fetal Heart Rate: 155 Fundal Height (cm): 39 cm Presentation: Vertex  Dilation: Fingertip Effacement (%): 50 Fetal Station: -3  Postpartum Depression: Low Risk  (2024)    Benedict  Depression Scale     Last EPDS Total Score: 3     Last EPDS Self Harm Result: Never        Prenatal Labs  Lab Results   Component Value Date    HGB 8.9 (L) 2025    HCT 28.2 (L) 2025     2025    SYPHT Negative 2025     Lab Results   Component Value Date    GLUC1P 93 2025     STREPTOCOCCUS, GROUP B CULTURE   Date Value Ref Range Status   2025 SEE NOTE  Final     Comment:         STREPTOCOCCUS, GROUP B CULTURE         Micro Number:      [id number]    Test Status:       Final    Specimen Source:   Vaginal/anorectal    Specimen Quality:  Adequate    Result:            No group B Streptococcus isolated                       Note per CDC guidelines optimal recovery is                       achieved by swabbing both the lower vagina and                       rectum (through the anal sphincter).         Assessment/Plan   Problem List Items Addressed This Visit          Pregnancy    38 weeks gestation of pregnancy ([facility])    Overview     Desired provider in labor: [x] CNM  [] Physician  [x] Blood Products: [x] Yes, accepts [] No, needs counseling  [x] Initial BMI: Could not be calculated   [x] Prenatal Labs: 10/7  [x] Cervical Cancer Screening: N/A, <22 y/o  [x] Rh  status: NEG, needs Rhogam at 28wks  [x] Genetic Screening:  rr cfDNA  [x] NT US: (11-13 wks) nml  [x] Baby ASA (if indicated):  [x] Pregnancy dated by: LMP c/w 6wk US at OSH     [x] Anatomy US: (19-20 wks): - wnl   [x] Federal Sterilization consent signed (if indicated): n/a  [x] 1hr GCT at 24-28wks: drawn 2/3/25  [x] Rhogam (if indicated): 2/3/25  [] Fetal Surveillance (if indicated):  [x] Tdap (27-32 wks, may be given up to 36 wks if initial window missed): 2/3/25  [x] RSV (32-36 wks) (Sept. to end of ): not offered  [x] Flu Vaccine: Declined ,    [x] Breastfeeding: Undecided, leaning toward bottle-feeding. Breast pump ordered  [x] Postpartum Birth control method: leaning towards Nexplanon   [x] GBS at 36 - 37 wks: neg3/24  [] 39 weeks discussion of IOL vs. Expectant management: IOL  [] Mode of delivery (anticipated):  anticipated          Iron deficiency anemia during pregnancy    Overview     - noted on CBC 3/3/25, rx sent 3/4/25  - repeat hgb 8.9 (from 9.1) without increased reticulocytes. For IV iron            OBGYN Infusion    Iron deficiency anemia during pregnancy    Overview     - noted on CBC 3/3/25, rx sent 3/4/25  - repeat hgb 8.9 (from 9.1) without increased reticulocytes. For IV iron            Line Care Therapy Plan    Iron deficiency anemia during pregnancy    Overview     - noted on CBC 3/3/25, rx sent 3/4/25  - repeat hgb 8.9 (from 9.1) without increased reticulocytes. For IV iron          Other Visit Diagnoses       Encounter for supervision of normal primigravida in third trimester, antepartum    -  Primary          Reviewed lab results , will plan for IV iron on PP unit  Coping mechanisms and pain management options during labor discussed, hand out provided in AVS  Discussed methods and expectations using during IOL, hand out provided in AVS  IOL scheduled 4/15/25  Reviewed s/sx of labor, warning signs, fetal movement counts, and when to call provider  Follow up in 1 week  for scheduled IOL, plan for PP visit 4-6 weeks.    Amelie Pickens, APRN-CNM, APRN-CNP

## 2025-04-08 NOTE — H&P
OB Triage H&P    Juliet Kwok is a 19 y.o.  at 38w0d here for leakage of fluid and also complaining of spotting after having cervix checked today but now stopped     - She has no signs of ROM, is without ferning, nitrizine negative, and no pooling of fluid - urine dip was negative for blood, not in labor  - mildly tachycardic during contractions but otherwise not concerning  - discussed return precautions discharge home bleeding from cervical check prior in the day  - comfortable with going home  -IV Iron- 200 sucrose given due to anemia      Subjective   Good fetal movement. C/O bloody show., C/O of occasional contractions., reports leakage of fluid while in shower appx 1730 today    Prenatal Provider Kristi Nicole    Pregnancy Problems (from 24 to present)       Problem Noted Diagnosed Resolved    Iron deficiency anemia during pregnancy 3/4/2025 by Flor Dickson MD  No    Priority:  Medium       Overview Addendum 3/19/2025  7:54 AM by Kristi Nicole MD     - noted on CBC 3/3/25, rx sent 3/4/25  - repeat hgb 8.9 (from 9.1) without increased reticulocytes. For IV iron         Asthma affecting pregnancy, antepartum ([facility]) 2025 by Madai Ko MD  No    Priority:  Medium       Rh negative state in antepartum period ([facility]) 2024 by Madai Ko MD  No    Priority:  Medium       Overview Signed 2/3/2025 12:46 PM by Flor Dickson MD     - Rhogam given 2/3/25         38 weeks gestation of pregnancy ([facility]) 10/7/2024 by Natasha De Leon MD  No    Priority:  Medium       Overview Addendum 3/31/2025 11:49 AM by Madai Ko MD     Desired provider in labor: [x] CNM  [] Physician  [x] Blood Products: [x] Yes, accepts [] No, needs counseling  [x] Initial BMI: Could not be calculated   [x] Prenatal Labs: 10/7  [x] Cervical Cancer Screening: N/A, <22 y/o  [x] Rh status: NEG, needs Rhogam at 28wks  [x] Genetic Screening:  rr cfDNA  [x] NT US: (11-13 wks) nml  [x]  Baby ASA (if indicated):  [x] Pregnancy dated by: LMP c/w 6wk US at OSH     [x] Anatomy US: (19-20 wks): - wnl   [x] Federal Sterilization consent signed (if indicated): n/a  [x] 1hr GCT at 24-28wks: drawn 2/3/25  [x] Rhogam (if indicated): 2/3/25  [] Fetal Surveillance (if indicated):  [x] Tdap (27-32 wks, may be given up to 36 wks if initial window missed): 2/3/25  [x] RSV (32-36 wks) (Sept. to end of ): not offered  [x] Flu Vaccine: Declined ,    [x] Breastfeeding: Undecided, leaning toward bottle-feeding. Breast pump ordered  [x] Postpartum Birth control method: leaning towards Nexplanon   [x] GBS at 36 - 37 wks: neg3/24  [] 39 weeks discussion of IOL vs. Expectant management: IOL  [] Mode of delivery (anticipated):  anticipated          Vaginal bleeding in pregnancy, first trimester ([facility]) 10/7/2024 by Natasha De Leon MD  No    Priority:  Medium       Bacterial vaginosis in pregnancy ([facility]) 2024 by Madai Ko MD  2/3/2025 by Flor Dickson MD    Back pain in pregnancy ([facility]) 2024 by Madai Ko MD  2024 by Madai Ko MD    Chlamydia infection affecting pregnancy in first trimester ([facility]) 2024 by NATACHA Ngo-SARAH  2/3/2025 by Flor Dickson MD    Overview Addendum 2/3/2025 12:42 PM by Flor Dickson MD     +chlamydia , treated with azithromycin 8/15/2024  - MARTIN negative in October         Pregnancy of unknown anatomic location ([facility]) 2024 by NATACHA Ngo-SARAH  10/7/2024 by Natasha De Leon MD            OB History    Para Term  AB Living   1 0 0 0 0 0   SAB IAB Ectopic Multiple Live Births   0 0 0 0 0      # Outcome Date GA Lbr Yoseph/2nd Weight Sex Type Anes PTL Lv   1 Current                No past surgical history on file.    Social History     Tobacco Use    Smoking status: Never    Smokeless tobacco: Never   Substance Use Topics    Alcohol use: Never       No Known  Allergies    Medications Prior to Admission   Medication Sig Dispense Refill Last Dose/Taking    albuterol 2.5 mg /3 mL (0.083 %) nebulizer solution Take 3 mL (2.5 mg) by nebulization every 6 hours if needed for wheezing. 75 mL 11 Past Week    albuterol 90 mcg/actuation inhaler Inhale 2 puffs every 6 hours if needed for wheezing. 18 g 11 Past Week    budesonide-formoteroL (Symbicort) 160-4.5 mcg/actuation inhaler Inhale 2 puffs 2 times a day. Rinse mouth with water after use to reduce aftertaste and incidence of candidiasis. Do not swallow. 10.2 g 11 Past Week    aspirin 81 mg EC tablet Take 2 tablets (162 mg) by mouth once daily. Start after 12 weeks of pregnancy 60 tablet 11     calcium carbonate (Tums) 200 mg calcium chewable tablet Chew 2 tablets (1,000 mg) once daily. 60 tablet 11     docosanol cream (Abreva) 10 % cream cream Apply topically 5 times a day. APPLY AND RUB IN until healed       famotidine (Pepcid) 20 mg tablet Take 1 tablet (20 mg) by mouth 2 times a day. For heartburn 60 tablet 3     ferrous sulfate tablet Take 1 tablet (325 mg) by mouth 2 times a day. 60 tablet 1     lidocaine (Lidoderm) 5 % patch Place 1 patch over 12 hours on the skin once daily. Remove & discard patch within 12 hours or as directed by MD. Do not place over abdomen. 10 patch 0     loratadine (Claritin) 10 mg tablet Take 1 tablet (10 mg) by mouth once daily as needed for allergies.       polyethylene glycol (Glycolax, Miralax) 17 gram packet Take 17 g by mouth once daily. 60 packet 0     prenatal vitamin, iron-folic, 27 mg iron-800 mcg folic acid tablet Take 1 tablet by mouth once daily. 30 tablet 0      Objective     Last Vitals  Temp Pulse Resp BP MAP O2 Sat   37.5 °C (99.5 °F) 105 16 126/59 84 97 %         Physical Exam  General: NAD, mood appropriate  Cardiopulmonary: warm and well perfused, breathing comfortably on room air  Abdomen: Gravid, non-tender  Extremities: Symmetric  Speculum Exam: no pooling of fluid seen,  Nitrazine test is negative, Ferning test is negative, vaginal discharge is thin and white   Cervix: closed      Fetal Monitoring  Baseline: 140-150 bpm, Variability: Moderate, Accelerations: Present and Decelerations: None  Uterine Activity:  contrations present, inconsistent frequency  Interpretation: Reactive    Bedside Ultrasound: NO    Labs in chart were reviewed. Previous CBC showed low Hgb -> was scheduled for venofer infusion but missed appt          Prenatal labs reviewed, not remarkable

## 2025-04-11 ENCOUNTER — APPOINTMENT (OUTPATIENT)
Dept: OBSTETRICS AND GYNECOLOGY | Facility: CLINIC | Age: 20
End: 2025-04-11
Payer: COMMERCIAL

## 2025-04-11 LAB
ATRIAL RATE: 127 BPM
P AXIS: 60 DEGREES
P OFFSET: 215 MS
P ONSET: 169 MS
PR INTERVAL: 114 MS
Q ONSET: 226 MS
QRS COUNT: 21 BEATS
QRS DURATION: 86 MS
QT INTERVAL: 340 MS
QTC CALCULATION(BAZETT): 494 MS
QTC FREDERICIA: 436 MS
R AXIS: 47 DEGREES
T AXIS: 28 DEGREES
T OFFSET: 396 MS
VENTRICULAR RATE: 127 BPM

## 2025-04-14 ENCOUNTER — HOSPITAL ENCOUNTER (INPATIENT)
Facility: HOSPITAL | Age: 20
LOS: 3 days | Discharge: HOME | End: 2025-04-17
Attending: STUDENT IN AN ORGANIZED HEALTH CARE EDUCATION/TRAINING PROGRAM | Admitting: STUDENT IN AN ORGANIZED HEALTH CARE EDUCATION/TRAINING PROGRAM
Payer: COMMERCIAL

## 2025-04-14 ENCOUNTER — ANESTHESIA EVENT (OUTPATIENT)
Dept: OBSTETRICS AND GYNECOLOGY | Facility: HOSPITAL | Age: 20
End: 2025-04-14
Payer: COMMERCIAL

## 2025-04-14 ENCOUNTER — ANESTHESIA (OUTPATIENT)
Dept: OBSTETRICS AND GYNECOLOGY | Facility: HOSPITAL | Age: 20
End: 2025-04-14
Payer: COMMERCIAL

## 2025-04-14 ENCOUNTER — APPOINTMENT (OUTPATIENT)
Dept: OBSTETRICS AND GYNECOLOGY | Facility: HOSPITAL | Age: 20
End: 2025-04-14
Payer: COMMERCIAL

## 2025-04-14 DIAGNOSIS — Z34.90 ENCOUNTER FOR INDUCTION OF LABOR: Primary | ICD-10-CM

## 2025-04-14 DIAGNOSIS — Z3A.37 37 WEEKS GESTATION OF PREGNANCY (HHS-HCC): ICD-10-CM

## 2025-04-14 LAB
ABO GROUP (TYPE) IN BLOOD: NORMAL
ANTIBODY SCREEN: NORMAL
ERYTHROCYTE [DISTWIDTH] IN BLOOD BY AUTOMATED COUNT: 17 % (ref 11.5–14.5)
HCT VFR BLD AUTO: 30.6 % (ref 36–46)
HGB BLD-MCNC: 9.3 G/DL (ref 12–16)
MCH RBC QN AUTO: 23.6 PG (ref 26–34)
MCHC RBC AUTO-ENTMCNC: 30.4 G/DL (ref 32–36)
MCV RBC AUTO: 78 FL (ref 80–100)
NRBC BLD-RTO: 0 /100 WBCS (ref 0–0)
PLATELET # BLD AUTO: 347 X10*3/UL (ref 150–450)
RBC # BLD AUTO: 3.94 X10*6/UL (ref 4–5.2)
RH FACTOR (ANTIGEN D): NORMAL
TREPONEMA PALLIDUM IGG+IGM AB [PRESENCE] IN SERUM OR PLASMA BY IMMUNOASSAY: NONREACTIVE
WBC # BLD AUTO: 12.1 X10*3/UL (ref 4.4–11.3)

## 2025-04-14 PROCEDURE — 86901 BLOOD TYPING SEROLOGIC RH(D): CPT

## 2025-04-14 PROCEDURE — 86885 COOMBS TEST INDIRECT QUAL: CPT

## 2025-04-14 PROCEDURE — 84075 ASSAY ALKALINE PHOSPHATASE: CPT

## 2025-04-14 PROCEDURE — 86077 PHYS BLOOD BANK SERV XMATCH: CPT | Performed by: PATHOLOGY

## 2025-04-14 PROCEDURE — 59050 FETAL MONITOR W/REPORT: CPT

## 2025-04-14 PROCEDURE — 86922 COMPATIBILITY TEST ANTIGLOB: CPT

## 2025-04-14 PROCEDURE — 2500000001 HC RX 250 WO HCPCS SELF ADMINISTERED DRUGS (ALT 637 FOR MEDICARE OP): Mod: SE

## 2025-04-14 PROCEDURE — 86780 TREPONEMA PALLIDUM: CPT

## 2025-04-14 PROCEDURE — 36415 COLL VENOUS BLD VENIPUNCTURE: CPT

## 2025-04-14 PROCEDURE — 0U7C7DJ DILATION OF CERVIX WITH INTRALUM DEV, TEMP, VIA OPENING: ICD-10-PCS

## 2025-04-14 PROCEDURE — 7210000002 HC LABOR PER HOUR

## 2025-04-14 PROCEDURE — 1120000001 HC OB PRIVATE ROOM DAILY

## 2025-04-14 PROCEDURE — 2500000004 HC RX 250 GENERAL PHARMACY W/ HCPCS (ALT 636 FOR OP/ED): Mod: SE

## 2025-04-14 PROCEDURE — 3E0P7VZ INTRODUCTION OF HORMONE INTO FEMALE REPRODUCTIVE, VIA NATURAL OR ARTIFICIAL OPENING: ICD-10-PCS

## 2025-04-14 PROCEDURE — 86870 RBC ANTIBODY IDENTIFICATION: CPT

## 2025-04-14 PROCEDURE — 85027 COMPLETE CBC AUTOMATED: CPT

## 2025-04-14 RX ORDER — FLUTICASONE FUROATE AND VILANTEROL 200; 25 UG/1; UG/1
1 POWDER RESPIRATORY (INHALATION)
Status: DISCONTINUED | OUTPATIENT
Start: 2025-04-14 | End: 2025-04-17 | Stop reason: HOSPADM

## 2025-04-14 RX ORDER — TRANEXAMIC ACID 100 MG/ML
1000 INJECTION, SOLUTION INTRAVENOUS ONCE AS NEEDED
Status: DISCONTINUED | OUTPATIENT
Start: 2025-04-14 | End: 2025-04-15

## 2025-04-14 RX ORDER — OXYTOCIN/0.9 % SODIUM CHLORIDE 30/500 ML
60 PLASTIC BAG, INJECTION (ML) INTRAVENOUS ONCE AS NEEDED
Status: DISCONTINUED | OUTPATIENT
Start: 2025-04-14 | End: 2025-04-15

## 2025-04-14 RX ORDER — MISOPROSTOL 200 UG/1
800 TABLET ORAL ONCE AS NEEDED
Status: DISCONTINUED | OUTPATIENT
Start: 2025-04-14 | End: 2025-04-15

## 2025-04-14 RX ORDER — SODIUM CHLORIDE, SODIUM LACTATE, POTASSIUM CHLORIDE, CALCIUM CHLORIDE 600; 310; 30; 20 MG/100ML; MG/100ML; MG/100ML; MG/100ML
75 INJECTION, SOLUTION INTRAVENOUS CONTINUOUS
Status: DISCONTINUED | OUTPATIENT
Start: 2025-04-14 | End: 2025-04-15

## 2025-04-14 RX ORDER — ONDANSETRON HYDROCHLORIDE 2 MG/ML
4 INJECTION, SOLUTION INTRAVENOUS EVERY 6 HOURS PRN
Status: DISCONTINUED | OUTPATIENT
Start: 2025-04-14 | End: 2025-04-15

## 2025-04-14 RX ORDER — METHYLERGONOVINE MALEATE 0.2 MG/ML
0.2 INJECTION INTRAVENOUS ONCE AS NEEDED
Status: DISCONTINUED | OUTPATIENT
Start: 2025-04-14 | End: 2025-04-15

## 2025-04-14 RX ORDER — HYDRALAZINE HYDROCHLORIDE 20 MG/ML
5 INJECTION INTRAMUSCULAR; INTRAVENOUS ONCE AS NEEDED
Status: DISCONTINUED | OUTPATIENT
Start: 2025-04-14 | End: 2025-04-15

## 2025-04-14 RX ORDER — OXYTOCIN 10 [USP'U]/ML
10 INJECTION, SOLUTION INTRAMUSCULAR; INTRAVENOUS ONCE AS NEEDED
Status: DISCONTINUED | OUTPATIENT
Start: 2025-04-14 | End: 2025-04-15

## 2025-04-14 RX ORDER — CARBOPROST TROMETHAMINE 250 UG/ML
250 INJECTION, SOLUTION INTRAMUSCULAR ONCE AS NEEDED
Status: DISCONTINUED | OUTPATIENT
Start: 2025-04-14 | End: 2025-04-15

## 2025-04-14 RX ORDER — LABETALOL HYDROCHLORIDE 5 MG/ML
20 INJECTION, SOLUTION INTRAVENOUS ONCE AS NEEDED
Status: DISCONTINUED | OUTPATIENT
Start: 2025-04-14 | End: 2025-04-15

## 2025-04-14 RX ORDER — CALCIUM CARBONATE 200(500)MG
2 TABLET,CHEWABLE ORAL DAILY
Status: DISCONTINUED | OUTPATIENT
Start: 2025-04-14 | End: 2025-04-17 | Stop reason: HOSPADM

## 2025-04-14 RX ORDER — LIDOCAINE HYDROCHLORIDE 10 MG/ML
30 INJECTION, SOLUTION INFILTRATION; PERINEURAL ONCE AS NEEDED
Status: DISCONTINUED | OUTPATIENT
Start: 2025-04-14 | End: 2025-04-15

## 2025-04-14 RX ORDER — ONDANSETRON 4 MG/1
4 TABLET, FILM COATED ORAL EVERY 6 HOURS PRN
Status: DISCONTINUED | OUTPATIENT
Start: 2025-04-14 | End: 2025-04-15

## 2025-04-14 RX ORDER — OXYTOCIN/0.9 % SODIUM CHLORIDE 30/500 ML
60 PLASTIC BAG, INJECTION (ML) INTRAVENOUS ONCE AS NEEDED
Status: COMPLETED | OUTPATIENT
Start: 2025-04-14 | End: 2025-04-15

## 2025-04-14 RX ORDER — FAMOTIDINE 20 MG/1
20 TABLET, FILM COATED ORAL 2 TIMES DAILY
Status: DISCONTINUED | OUTPATIENT
Start: 2025-04-14 | End: 2025-04-17 | Stop reason: HOSPADM

## 2025-04-14 RX ORDER — LOPERAMIDE HYDROCHLORIDE 2 MG/1
4 CAPSULE ORAL EVERY 2 HOUR PRN
Status: DISCONTINUED | OUTPATIENT
Start: 2025-04-14 | End: 2025-04-15

## 2025-04-14 RX ORDER — ALBUTEROL SULFATE 90 UG/1
2 INHALANT RESPIRATORY (INHALATION) EVERY 6 HOURS PRN
Status: DISCONTINUED | OUTPATIENT
Start: 2025-04-14 | End: 2025-04-17 | Stop reason: HOSPADM

## 2025-04-14 RX ORDER — TERBUTALINE SULFATE 1 MG/ML
0.25 INJECTION SUBCUTANEOUS ONCE AS NEEDED
Status: DISCONTINUED | OUTPATIENT
Start: 2025-04-14 | End: 2025-04-15

## 2025-04-14 RX ADMIN — MISOPROSTOL 25 MCG: 100 TABLET ORAL at 23:40

## 2025-04-14 RX ADMIN — SODIUM CHLORIDE, POTASSIUM CHLORIDE, SODIUM LACTATE AND CALCIUM CHLORIDE 75 ML/HR: 600; 310; 30; 20 INJECTION, SOLUTION INTRAVENOUS at 20:03

## 2025-04-14 SDOH — HEALTH STABILITY: MENTAL HEALTH: WISH TO BE DEAD (PAST 1 MONTH): NO

## 2025-04-14 SDOH — HEALTH STABILITY: PHYSICAL HEALTH
HOW OFTEN DO YOU NEED TO HAVE SOMEONE HELP YOU WHEN YOU READ INSTRUCTIONS, PAMPHLETS, OR OTHER WRITTEN MATERIAL FROM YOUR DOCTOR OR PHARMACY?: SOMETIMES

## 2025-04-14 SDOH — HEALTH STABILITY: MENTAL HEALTH: SUICIDAL BEHAVIOR (LIFETIME): NO

## 2025-04-14 SDOH — ECONOMIC STABILITY: FOOD INSECURITY: WITHIN THE PAST 12 MONTHS, YOU WORRIED THAT YOUR FOOD WOULD RUN OUT BEFORE YOU GOT THE MONEY TO BUY MORE.: NEVER TRUE

## 2025-04-14 SDOH — SOCIAL STABILITY: SOCIAL INSECURITY: PHYSICAL ABUSE: DENIES

## 2025-04-14 SDOH — SOCIAL STABILITY: SOCIAL INSECURITY: WITHIN THE LAST YEAR, HAVE YOU BEEN HUMILIATED OR EMOTIONALLY ABUSED IN OTHER WAYS BY YOUR PARTNER OR EX-PARTNER?: NO

## 2025-04-14 SDOH — SOCIAL STABILITY: SOCIAL INSECURITY: WITHIN THE LAST YEAR, HAVE YOU BEEN AFRAID OF YOUR PARTNER OR EX-PARTNER?: NO

## 2025-04-14 SDOH — SOCIAL STABILITY: SOCIAL INSECURITY: DO YOU FEEL ANYONE HAS EXPLOITED OR TAKEN ADVANTAGE OF YOU FINANCIALLY OR OF YOUR PERSONAL PROPERTY?: NO

## 2025-04-14 SDOH — HEALTH STABILITY: MENTAL HEALTH: WERE YOU ABLE TO COMPLETE ALL THE BEHAVIORAL HEALTH SCREENINGS?: YES

## 2025-04-14 SDOH — ECONOMIC STABILITY: FOOD INSECURITY: WITHIN THE PAST 12 MONTHS, THE FOOD YOU BOUGHT JUST DIDN'T LAST AND YOU DIDN'T HAVE MONEY TO GET MORE.: NEVER TRUE

## 2025-04-14 SDOH — ECONOMIC STABILITY: FOOD INSECURITY: HOW HARD IS IT FOR YOU TO PAY FOR THE VERY BASICS LIKE FOOD, HOUSING, MEDICAL CARE, AND HEATING?: NOT VERY HARD

## 2025-04-14 SDOH — HEALTH STABILITY: MENTAL HEALTH: NON-SPECIFIC ACTIVE SUICIDAL THOUGHTS (PAST 1 MONTH): NO

## 2025-04-14 SDOH — SOCIAL STABILITY: SOCIAL INSECURITY: ARE THERE ANY APPARENT SIGNS OF INJURIES/BEHAVIORS THAT COULD BE RELATED TO ABUSE/NEGLECT?: NO

## 2025-04-14 SDOH — SOCIAL STABILITY: SOCIAL INSECURITY: HAS ANYONE EVER THREATENED TO HURT YOUR FAMILY OR YOUR PETS?: NO

## 2025-04-14 SDOH — HEALTH STABILITY: MENTAL HEALTH: HAVE YOU USED ANY SUBSTANCES (CANABIS, COCAINE, HEROIN, HALLUCINOGENS, INHALANTS, ETC.) IN THE PAST 12 MONTHS?: NO

## 2025-04-14 SDOH — SOCIAL STABILITY: SOCIAL INSECURITY: ABUSE SCREEN: ADULT

## 2025-04-14 SDOH — SOCIAL STABILITY: SOCIAL INSECURITY: VERBAL ABUSE: DENIES

## 2025-04-14 SDOH — HEALTH STABILITY: MENTAL HEALTH: HAVE YOU USED ANY PRESCRIPTION DRUGS OTHER THAN PRESCRIBED IN THE PAST 12 MONTHS?: NO

## 2025-04-14 SDOH — ECONOMIC STABILITY: HOUSING INSECURITY: DO YOU FEEL UNSAFE GOING BACK TO THE PLACE WHERE YOU ARE LIVING?: NO

## 2025-04-14 SDOH — HEALTH STABILITY: MENTAL HEALTH: CURRENT SMOKER: 0

## 2025-04-14 SDOH — SOCIAL STABILITY: SOCIAL INSECURITY: HAVE YOU HAD ANY THOUGHTS OF HARMING ANYONE ELSE?: NO

## 2025-04-14 SDOH — SOCIAL STABILITY: SOCIAL INSECURITY: HAVE YOU HAD THOUGHTS OF HARMING ANYONE ELSE?: NO

## 2025-04-14 SDOH — SOCIAL STABILITY: SOCIAL INSECURITY: ARE YOU OR HAVE YOU BEEN THREATENED OR ABUSED PHYSICALLY, EMOTIONALLY, OR SEXUALLY BY ANYONE?: NO

## 2025-04-14 SDOH — ECONOMIC STABILITY: TRANSPORTATION INSECURITY: IN THE PAST 12 MONTHS, HAS LACK OF TRANSPORTATION KEPT YOU FROM MEDICAL APPOINTMENTS OR FROM GETTING MEDICATIONS?: NO

## 2025-04-14 SDOH — SOCIAL STABILITY: SOCIAL INSECURITY: DOES ANYONE TRY TO KEEP YOU FROM HAVING/CONTACTING OTHER FRIENDS OR DOING THINGS OUTSIDE YOUR HOME?: NO

## 2025-04-14 ASSESSMENT — ACTIVITIES OF DAILY LIVING (ADL)
LACK_OF_TRANSPORTATION: NO
LACK_OF_TRANSPORTATION: NO

## 2025-04-14 ASSESSMENT — PAIN SCALES - GENERAL
PAINLEVEL_OUTOF10: 0 - NO PAIN

## 2025-04-14 ASSESSMENT — LIFESTYLE VARIABLES
HOW OFTEN DO YOU HAVE A DRINK CONTAINING ALCOHOL: NEVER
HOW MANY STANDARD DRINKS CONTAINING ALCOHOL DO YOU HAVE ON A TYPICAL DAY: PATIENT DOES NOT DRINK
SKIP TO QUESTIONS 9-10: 1
AUDIT-C TOTAL SCORE: 0
AUDIT-C TOTAL SCORE: 0
HOW OFTEN DO YOU HAVE 6 OR MORE DRINKS ON ONE OCCASION: NEVER

## 2025-04-14 ASSESSMENT — PATIENT HEALTH QUESTIONNAIRE - PHQ9
SUM OF ALL RESPONSES TO PHQ9 QUESTIONS 1 & 2: 0
1. LITTLE INTEREST OR PLEASURE IN DOING THINGS: NOT AT ALL
2. FEELING DOWN, DEPRESSED OR HOPELESS: NOT AT ALL

## 2025-04-14 NOTE — ANESTHESIA PREPROCEDURE EVALUATION
Patient: Juliet Kwok    Evaluation Method: In-person visit    Procedure Information    Date: 04/14/25  Procedure: Labor Consult         Relevant Problems   Anesthesia (within normal limits)      Pulmonary   (+) Asthma affecting pregnancy, antepartum ([facility])      Neuro (within normal limits)      GI (within normal limits)      /Renal (within normal limits)      Liver (within normal limits)      Endocrine (within normal limits)      Hematology   (+) Iron deficiency anemia during pregnancy      Musculoskeletal (within normal limits)      HEENT   (+) Seasonal allergies      ID (within normal limits)      Skin (within normal limits)      GYN   (+) 38 weeks gestation of pregnancy ([facility])       Clinical information reviewed:   Tobacco  Allergies  Meds   Med Hx  Surg Hx   Fam Hx  Soc Hx        NPO Detail:  No data recorded     OB/Gyn Evaluation    Present Pregnancy    Patient is pregnant now.   Obstetric History                Physical Exam    Airway  Mallampati: II  TM distance: >3 FB  Neck ROM: full     Cardiovascular    Dental - normal exam     Pulmonary    Abdominal            Anesthesia Plan    History of general anesthesia?: no  History of complications of general anesthesia?: no    ASA 2     epidural   (R/B/A of epidural discussed with patient.)  The patient is not a current smoker.    Anesthetic plan and risks discussed with patient.  Use of blood products discussed with patient who consented to blood products.    Plan discussed with CAA and attending.

## 2025-04-14 NOTE — H&P
OB Admission H&P    Assessment/Plan    Juliet Kwok is a 19 y.o.  at 39w0d, ISIDRO: 2025, by Last Menstrual Period, who presents for Induction of Labor.    Plan  -Admit to L&D, consented  -T&S, CBC, and Syphilis  -Epidural at patient request  -Recheck as clinically indicated by maternal or fetal status  -Plan to initiate induction with CRB and cytotec   -Patient was placed in dorsal lithotomy position, a speculum was placed, and a cervical ripening balloon was guided through the external and internal cervical os with a ring forceps. The balloon was inflated with 60cc of normal saline. Pt tolerated the procedure well.    Fetal Status  -NST reactive, reassuring   -Presentation cephalic based on ultrasound  -GBS neg    Pregnancy Notables  -anemia, admit hgb 9.3, T&C x1  -asthma, weekly albuterol use   -Rh neg, s/p rhogam   -chlamydia with neg MARTIN     Postpartum  Contraception Plan: patient declined    Discussed with and to be seen by Dr. Herb Felton MD  PGY1, Obstetrics and Gynecology    Subjective   Good fetal movement.  Denies vaginal bleeding., Occasional contractions., Denies leaking of fluid.  Birth wishes reviewed. No acute concerns.     Prenatal Provider -[place]     OB History    Para Term  AB Living   1 0 0 0 0 0   SAB IAB Ectopic Multiple Live Births   0 0 0 0 0      # Outcome Date GA Lbr Yoseph/2nd Weight Sex Type Anes PTL Lv   1 Current                History reviewed. No pertinent surgical history.    Social History     Tobacco Use    Smoking status: Never    Smokeless tobacco: Never   Substance Use Topics    Alcohol use: Never       No Known Allergies    Medications Prior to Admission   Medication Sig Dispense Refill Last Dose/Taking    albuterol 2.5 mg /3 mL (0.083 %) nebulizer solution Take 3 mL (2.5 mg) by nebulization every 6 hours if needed for wheezing. 75 mL 11 Past Month    albuterol 90 mcg/actuation inhaler Inhale 2 puffs every 6 hours if needed  for wheezing. 18 g 11 Past Month    aspirin 81 mg EC tablet Take 2 tablets (162 mg) by mouth once daily. Start after 12 weeks of pregnancy 60 tablet 11     budesonide-formoteroL (Symbicort) 160-4.5 mcg/actuation inhaler Inhale 2 puffs 2 times a day. Rinse mouth with water after use to reduce aftertaste and incidence of candidiasis. Do not swallow. 10.2 g 11     calcium carbonate (Tums) 200 mg calcium chewable tablet Chew 2 tablets (1,000 mg) once daily. 60 tablet 11     docosanol cream (Abreva) 10 % cream cream Apply topically 5 times a day. APPLY AND RUB IN until healed   Unknown    famotidine (Pepcid) 20 mg tablet Take 1 tablet (20 mg) by mouth 2 times a day. For heartburn 60 tablet 3     ferrous sulfate tablet Take 1 tablet (325 mg) by mouth 2 times a day. 60 tablet 1 Unknown    lidocaine (Lidoderm) 5 % patch Place 1 patch over 12 hours on the skin once daily. Remove & discard patch within 12 hours or as directed by MD. Do not place over abdomen. 10 patch 0     loratadine (Claritin) 10 mg tablet Take 1 tablet (10 mg) by mouth once daily as needed for allergies. (Patient not taking: Reported on 4/14/2025)   More than a month    polyethylene glycol (Glycolax, Miralax) 17 gram packet Take 17 g by mouth once daily. (Patient not taking: Reported on 4/14/2025) 60 packet 0 More than a month    prenatal vitamin, iron-folic, 27 mg iron-800 mcg folic acid tablet Take 1 tablet by mouth once daily. (Patient not taking: Reported on 4/14/2025) 30 tablet 0 More than a month     Objective     Last Vitals  Temp Pulse Resp BP MAP O2 Sat   36.2 °C (97.2 °F) 81 18 126/61 86 97 %     Blood Pressures         4/14/2025  1753 4/14/2025  1930 4/14/2025  2343 4/15/2025  0053 4/15/2025  0150    BP: 128/66 121/64 130/69 121/55 126/61             Physical Exam  General: NAD, mood appropriate  Cardiopulmonary: warm and well perfused, breathing comfortably on room air  Abdomen: Gravid, non-tender  Extremities: Symmetric  Speculum Exam:  Normal  vaginal epithelium, normal-appearing cervix. No lesions or masses appreciated on cervix. No lesions or masses noted along the vaginal epithelium. Normal vaginal discharge, no odor.   Cervix: Closed /60 /-3     Chaperone Present: Yes.  Chaperone Name/Title: Bedside RN  Examination Chaperoned: Gynecological Exam     Fetal Monitoring  Baseline: 135 bpm, Variability: moderate,  Accelerations: present and Decelerations: none  Uterine Activity: q6-8 minutes  Interpretation: Category 1    Bedside ultrasound: Yes    Labs in chart were reviewed.   Results from last 7 days   Lab Units 04/14/25  1754   WBC AUTO x10*3/uL 12.1*   HEMOGLOBIN g/dL 9.3*   HEMATOCRIT % 30.6*   PLATELETS AUTO x10*3/uL 347        Prenatal labs reviewed, not remarkable.

## 2025-04-15 ENCOUNTER — ANESTHESIA (OUTPATIENT)
Dept: OBSTETRICS AND GYNECOLOGY | Facility: HOSPITAL | Age: 20
End: 2025-04-15
Payer: COMMERCIAL

## 2025-04-15 ENCOUNTER — ANESTHESIA EVENT (OUTPATIENT)
Dept: OBSTETRICS AND GYNECOLOGY | Facility: HOSPITAL | Age: 20
End: 2025-04-15
Payer: COMMERCIAL

## 2025-04-15 LAB
ALBUMIN SERPL BCP-MCNC: 3.6 G/DL (ref 3.4–5)
ALP SERPL-CCNC: 182 U/L (ref 33–110)
ALT SERPL W P-5'-P-CCNC: 12 U/L (ref 7–45)
ANION GAP SERPL CALC-SCNC: 20 MMOL/L (ref 10–20)
AST SERPL W P-5'-P-CCNC: 15 U/L (ref 9–39)
BB ANTIBODY IDENTIFICATION: NORMAL
BILIRUB SERPL-MCNC: 0.3 MG/DL (ref 0–1.2)
BUN SERPL-MCNC: 5 MG/DL (ref 6–23)
CALCIUM SERPL-MCNC: 9.3 MG/DL (ref 8.6–10.6)
CASE #: NORMAL
CHLORIDE SERPL-SCNC: 108 MMOL/L (ref 98–107)
CO2 SERPL-SCNC: 15 MMOL/L (ref 21–32)
CREAT SERPL-MCNC: 0.4 MG/DL (ref 0.5–1.05)
EGFRCR SERPLBLD CKD-EPI 2021: >90 ML/MIN/1.73M*2
GLUCOSE BLD MANUAL STRIP-MCNC: 87 MG/DL (ref 74–99)
GLUCOSE SERPL-MCNC: 66 MG/DL (ref 74–99)
PATH REV-IMMUNOHEMATOLOGY-PR30: NORMAL
POTASSIUM SERPL-SCNC: 4 MMOL/L (ref 3.5–5.3)
PROT SERPL-MCNC: 7.1 G/DL (ref 6.4–8.2)
SODIUM SERPL-SCNC: 139 MMOL/L (ref 136–145)

## 2025-04-15 PROCEDURE — 59409 OBSTETRICAL CARE: CPT

## 2025-04-15 PROCEDURE — 59409 OBSTETRICAL CARE: CPT | Performed by: OBSTETRICS & GYNECOLOGY

## 2025-04-15 PROCEDURE — 01967 NEURAXL LBR ANES VAG DLVR: CPT | Performed by: STUDENT IN AN ORGANIZED HEALTH CARE EDUCATION/TRAINING PROGRAM

## 2025-04-15 PROCEDURE — 51701 INSERT BLADDER CATHETER: CPT

## 2025-04-15 PROCEDURE — 2500000005 HC RX 250 GENERAL PHARMACY W/O HCPCS: Mod: SE | Performed by: STUDENT IN AN ORGANIZED HEALTH CARE EDUCATION/TRAINING PROGRAM

## 2025-04-15 PROCEDURE — 82947 ASSAY GLUCOSE BLOOD QUANT: CPT

## 2025-04-15 PROCEDURE — 10907ZC DRAINAGE OF AMNIOTIC FLUID, THERAPEUTIC FROM PRODUCTS OF CONCEPTION, VIA NATURAL OR ARTIFICIAL OPENING: ICD-10-PCS

## 2025-04-15 PROCEDURE — 7100000016 HC LABOR RECOVERY PER HOUR

## 2025-04-15 PROCEDURE — 2500000004 HC RX 250 GENERAL PHARMACY W/ HCPCS (ALT 636 FOR OP/ED): Mod: SE

## 2025-04-15 PROCEDURE — 2500000004 HC RX 250 GENERAL PHARMACY W/ HCPCS (ALT 636 FOR OP/ED): Mod: SE | Performed by: STUDENT IN AN ORGANIZED HEALTH CARE EDUCATION/TRAINING PROGRAM

## 2025-04-15 PROCEDURE — 7210000002 HC LABOR PER HOUR

## 2025-04-15 PROCEDURE — 3700000014 EPIDURAL BLOCK: Mod: GC

## 2025-04-15 PROCEDURE — 1100000001 HC PRIVATE ROOM DAILY

## 2025-04-15 PROCEDURE — 2500000001 HC RX 250 WO HCPCS SELF ADMINISTERED DRUGS (ALT 637 FOR MEDICARE OP): Mod: SE | Performed by: STUDENT IN AN ORGANIZED HEALTH CARE EDUCATION/TRAINING PROGRAM

## 2025-04-15 PROCEDURE — 0KQM0ZZ REPAIR PERINEUM MUSCLE, OPEN APPROACH: ICD-10-PCS | Performed by: OBSTETRICS & GYNECOLOGY

## 2025-04-15 PROCEDURE — 2500000001 HC RX 250 WO HCPCS SELF ADMINISTERED DRUGS (ALT 637 FOR MEDICARE OP): Mod: SE

## 2025-04-15 PROCEDURE — 3E033VJ INTRODUCTION OF OTHER HORMONE INTO PERIPHERAL VEIN, PERCUTANEOUS APPROACH: ICD-10-PCS

## 2025-04-15 RX ORDER — ONDANSETRON 4 MG/1
4 TABLET, FILM COATED ORAL EVERY 6 HOURS PRN
Status: DISCONTINUED | OUTPATIENT
Start: 2025-04-15 | End: 2025-04-17 | Stop reason: HOSPADM

## 2025-04-15 RX ORDER — MISOPROSTOL 200 UG/1
800 TABLET ORAL ONCE AS NEEDED
Status: DISCONTINUED | OUTPATIENT
Start: 2025-04-15 | End: 2025-04-17 | Stop reason: HOSPADM

## 2025-04-15 RX ORDER — FENTANYL/ROPIVACAINE/NS/PF 2MCG/ML-.2
0-25 PLASTIC BAG, INJECTION (ML) INJECTION CONTINUOUS
Status: DISCONTINUED | OUTPATIENT
Start: 2025-04-15 | End: 2025-04-15

## 2025-04-15 RX ORDER — ONDANSETRON HYDROCHLORIDE 2 MG/ML
4 INJECTION, SOLUTION INTRAVENOUS EVERY 6 HOURS PRN
Status: DISCONTINUED | OUTPATIENT
Start: 2025-04-15 | End: 2025-04-17 | Stop reason: HOSPADM

## 2025-04-15 RX ORDER — OXYTOCIN/0.9 % SODIUM CHLORIDE 30/500 ML
60 PLASTIC BAG, INJECTION (ML) INTRAVENOUS ONCE AS NEEDED
Status: DISCONTINUED | OUTPATIENT
Start: 2025-04-15 | End: 2025-04-17 | Stop reason: HOSPADM

## 2025-04-15 RX ORDER — POLYETHYLENE GLYCOL 3350 17 G/17G
17 POWDER, FOR SOLUTION ORAL 2 TIMES DAILY PRN
Status: DISCONTINUED | OUTPATIENT
Start: 2025-04-15 | End: 2025-04-17 | Stop reason: HOSPADM

## 2025-04-15 RX ORDER — OXYTOCIN/0.9 % SODIUM CHLORIDE 30/500 ML
2-30 PLASTIC BAG, INJECTION (ML) INTRAVENOUS CONTINUOUS
Status: DISCONTINUED | OUTPATIENT
Start: 2025-04-15 | End: 2025-04-15

## 2025-04-15 RX ORDER — TRANEXAMIC ACID 100 MG/ML
1000 INJECTION, SOLUTION INTRAVENOUS ONCE AS NEEDED
Status: DISCONTINUED | OUTPATIENT
Start: 2025-04-15 | End: 2025-04-17 | Stop reason: HOSPADM

## 2025-04-15 RX ORDER — IBUPROFEN 600 MG/1
600 TABLET ORAL EVERY 6 HOURS
Status: DISCONTINUED | OUTPATIENT
Start: 2025-04-15 | End: 2025-04-17 | Stop reason: HOSPADM

## 2025-04-15 RX ORDER — HYDRALAZINE HYDROCHLORIDE 20 MG/ML
5 INJECTION INTRAMUSCULAR; INTRAVENOUS ONCE AS NEEDED
Status: DISCONTINUED | OUTPATIENT
Start: 2025-04-15 | End: 2025-04-17 | Stop reason: HOSPADM

## 2025-04-15 RX ORDER — OXYTOCIN 10 [USP'U]/ML
10 INJECTION, SOLUTION INTRAMUSCULAR; INTRAVENOUS ONCE AS NEEDED
Status: DISCONTINUED | OUTPATIENT
Start: 2025-04-15 | End: 2025-04-17 | Stop reason: HOSPADM

## 2025-04-15 RX ORDER — CARBOPROST TROMETHAMINE 250 UG/ML
250 INJECTION, SOLUTION INTRAMUSCULAR ONCE AS NEEDED
Status: DISCONTINUED | OUTPATIENT
Start: 2025-04-15 | End: 2025-04-17 | Stop reason: HOSPADM

## 2025-04-15 RX ORDER — NALBUPHINE HYDROCHLORIDE 10 MG/ML
10 INJECTION INTRAMUSCULAR; INTRAVENOUS; SUBCUTANEOUS ONCE AS NEEDED
Status: CANCELLED | OUTPATIENT
Start: 2025-04-15

## 2025-04-15 RX ORDER — ADHESIVE BANDAGE
10 BANDAGE TOPICAL
Status: DISCONTINUED | OUTPATIENT
Start: 2025-04-15 | End: 2025-04-17 | Stop reason: HOSPADM

## 2025-04-15 RX ORDER — METHYLERGONOVINE MALEATE 0.2 MG/ML
0.2 INJECTION INTRAVENOUS ONCE AS NEEDED
Status: DISCONTINUED | OUTPATIENT
Start: 2025-04-15 | End: 2025-04-17 | Stop reason: HOSPADM

## 2025-04-15 RX ORDER — SIMETHICONE 80 MG
80 TABLET,CHEWABLE ORAL 4 TIMES DAILY PRN
Status: DISCONTINUED | OUTPATIENT
Start: 2025-04-15 | End: 2025-04-17 | Stop reason: HOSPADM

## 2025-04-15 RX ORDER — DIPHENHYDRAMINE HYDROCHLORIDE 50 MG/ML
25 INJECTION, SOLUTION INTRAMUSCULAR; INTRAVENOUS EVERY 6 HOURS PRN
Status: DISCONTINUED | OUTPATIENT
Start: 2025-04-15 | End: 2025-04-17 | Stop reason: HOSPADM

## 2025-04-15 RX ORDER — ACETAMINOPHEN 325 MG/1
975 TABLET ORAL EVERY 6 HOURS
Status: DISCONTINUED | OUTPATIENT
Start: 2025-04-15 | End: 2025-04-17 | Stop reason: HOSPADM

## 2025-04-15 RX ORDER — LOPERAMIDE HYDROCHLORIDE 2 MG/1
4 CAPSULE ORAL EVERY 2 HOUR PRN
Status: DISCONTINUED | OUTPATIENT
Start: 2025-04-15 | End: 2025-04-17 | Stop reason: HOSPADM

## 2025-04-15 RX ORDER — LABETALOL HYDROCHLORIDE 5 MG/ML
20 INJECTION, SOLUTION INTRAVENOUS ONCE AS NEEDED
Status: DISCONTINUED | OUTPATIENT
Start: 2025-04-15 | End: 2025-04-17 | Stop reason: HOSPADM

## 2025-04-15 RX ORDER — NALBUPHINE HYDROCHLORIDE 10 MG/ML
10 INJECTION INTRAMUSCULAR; INTRAVENOUS; SUBCUTANEOUS ONCE
Status: COMPLETED | OUTPATIENT
Start: 2025-04-15 | End: 2025-04-15

## 2025-04-15 RX ORDER — DIPHENHYDRAMINE HCL 25 MG
25 CAPSULE ORAL EVERY 6 HOURS PRN
Status: DISCONTINUED | OUTPATIENT
Start: 2025-04-15 | End: 2025-04-17 | Stop reason: HOSPADM

## 2025-04-15 RX ADMIN — Medication 5 ML: at 09:50

## 2025-04-15 RX ADMIN — WITCH HAZEL 1 EACH: 500 SOLUTION RECTAL; TOPICAL at 21:48

## 2025-04-15 RX ADMIN — IBUPROFEN 600 MG: 600 TABLET ORAL at 18:57

## 2025-04-15 RX ADMIN — NALBUPHINE HYDROCHLORIDE 10 MG: 10 INJECTION, SOLUTION INTRAMUSCULAR; INTRAVENOUS; SUBCUTANEOUS at 01:34

## 2025-04-15 RX ADMIN — FAMOTIDINE 20 MG: 20 TABLET, FILM COATED ORAL at 23:42

## 2025-04-15 RX ADMIN — ONDANSETRON 4 MG: 2 INJECTION INTRAMUSCULAR; INTRAVENOUS at 01:34

## 2025-04-15 RX ADMIN — Medication 10 ML/HR: at 09:51

## 2025-04-15 RX ADMIN — Medication 2 MILLI-UNITS/MIN: at 04:30

## 2025-04-15 RX ADMIN — BENZOCAINE AND LEVOMENTHOL 1 APPLICATION: 200; 5 SPRAY TOPICAL at 19:15

## 2025-04-15 RX ADMIN — SODIUM CHLORIDE, SODIUM LACTATE, POTASSIUM CHLORIDE, AND CALCIUM CHLORIDE 500 ML: .6; .31; .03; .02 INJECTION, SOLUTION INTRAVENOUS at 09:18

## 2025-04-15 RX ADMIN — FAMOTIDINE 20 MG: 20 TABLET, FILM COATED ORAL at 01:34

## 2025-04-15 RX ADMIN — SODIUM CHLORIDE, SODIUM LACTATE, POTASSIUM CHLORIDE, AND CALCIUM CHLORIDE 500 ML: .6; .31; .03; .02 INJECTION, SOLUTION INTRAVENOUS at 00:56

## 2025-04-15 RX ADMIN — SODIUM CHLORIDE, POTASSIUM CHLORIDE, SODIUM LACTATE AND CALCIUM CHLORIDE 75 ML/HR: 600; 310; 30; 20 INJECTION, SOLUTION INTRAVENOUS at 09:47

## 2025-04-15 RX ADMIN — SODIUM CHLORIDE, POTASSIUM CHLORIDE, SODIUM LACTATE AND CALCIUM CHLORIDE 75 ML/HR: 600; 310; 30; 20 INJECTION, SOLUTION INTRAVENOUS at 01:26

## 2025-04-15 RX ADMIN — ACETAMINOPHEN 975 MG: 325 TABLET, FILM COATED ORAL at 18:57

## 2025-04-15 RX ADMIN — Medication 60 MILLI-UNITS/MIN: at 17:47

## 2025-04-15 ASSESSMENT — PAIN SCALES - GENERAL
PAINLEVEL_OUTOF10: 0 - NO PAIN
PAINLEVEL_OUTOF10: 0 - NO PAIN
PAINLEVEL_OUTOF10: 1
PAINLEVEL_OUTOF10: 0 - NO PAIN
PAINLEVEL_OUTOF10: 9
PAINLEVEL_OUTOF10: 0 - NO PAIN
PAINLEVEL_OUTOF10: 5 - MODERATE PAIN
PAINLEVEL_OUTOF10: 0 - NO PAIN
PAINLEVEL_OUTOF10: 10 - WORST POSSIBLE PAIN
PAINLEVEL_OUTOF10: 0 - NO PAIN
PAINLEVEL_OUTOF10: 3
PAINLEVEL_OUTOF10: 0 - NO PAIN

## 2025-04-15 ASSESSMENT — PAIN DESCRIPTION - DESCRIPTORS: DESCRIPTORS: SORE

## 2025-04-15 NOTE — PROGRESS NOTES
Intrapartum Progress Note    Assessment/Plan   Juliet Kwok is a 19 y.o.  at 39w1d. ISIDRO: 2025, by Last Menstrual Period and confirmed by 6wga US admitted for IOL.     IOL  - latent labor, for SVE as clinically indicated   - continue to increase pitocin per protocol, currently at 4  - s/p cervical ripening  - plan for epidural and AROM after patient eats breakfast    Fetal status  - CEFM, currently cat I  - Presentation cephalic by US  - GBS negative    Postpartum  - Contraception: pt declines    I saw and evaluated the patient with the medical student. I have made any necessary modifications within the body of text.    Seen and d/w Dr. Cornell Aj MD, PGY-3    Subjective   Patient resting comfortably in bed at this time, denies pain. Is not feeling contractions. Reviewed plan for day with patient, desires epidural after she eats breakfast. Is doing well with IV Nubain.     Objective   Last Vitals:  Temp Pulse Resp BP MAP Pulse Ox   36.3 °C (97.3 °F) 88 16 120/58 81 97 %     Vitals Min/Max Last 24 Hours:  Temp  Min: 36.2 °C (97.2 °F)  Max: 37 °C (98.6 °F)  Pulse  Min: 77  Max: 100  Resp  Min: 16  Max: 146  BP  Min: 115/56  Max: 140/67  MAP (mmHg)  Min: 75  Max: 102    Intake/Output:    Intake/Output Summary (Last 24 hours) at 4/15/2025 0802  Last data filed at 4/15/2025 0436  Gross per 24 hour   Intake 641.25 ml   Output --   Net 641.25 ml       Physical Examination:  General: no acute distress  HEENT: normocephalic, atraumatic  Heart: warm and well perfused  Lungs: breathing comfortably on room air  Abdomen: gravid  Extremities: moving all extremities  Neuro: awake and conversant  Psych: appropriate mood and affect     130/moderate/+accels/-decels   [place]: q2-3min   Cervical check: deferred    Lab Review:  Labs in chart were reviewed.

## 2025-04-15 NOTE — L&D DELIVERY NOTE
Vaginal Delivery Note    Patient Name: Juliet Kwok  : 2005  MRN: [id number]  Age: 19 y.o.    /Para:   Gestational Age: 39w1d    Date of Delivery: 4/15/2025    Procedure: Normal Spontaneous Vaginal Delivery    Delivery Provider: Tone Sarabia MD     Resident/Fellow/Other Assistant: Mk Aj MD     Description of Procedure:  Delivery of viable infant under epidural anesthesia. Delayed clamping was performed. The infant was placed skin to skin. Cord gases were not sent.  Cord blood was collected. Placenta delivered intact and fundus was firm    2nd degree perineal laceration identified and repaired using 2-0 Vicryl.     Findings:   Amniotic fluid Clear, Male infant in Vertex Occiput Anterior presentation, APGARS 8 , 9 .  Birth Weight 3.48 kg.    Complications: None    Quantitative Blood Loss:   Delivery Blood Loss   Intrapartum & Postpartum: 25 - 04/15/25 1834    Delivery Admission: 25 - 04/15/25 1834         Intrapartum & Postpartum Delivery Admission    Quantitative Blood Loss - (mL) Hospital Encounter 331 mL 331 mL    Total  331 mL 331 mL            Blood products:      Uterotonics/Hemostatic Agent: IV Pitocin 30 units    Specimen:   Placenta  Delivered: 4/15/2025  5:22 PM  Appearance: Intact  Removal: Spontaneous    Disposition:      Sponge/Instrument/Needle Counts: The sponge, lap and needle counts were correct.    Patient Disposition: Patient recovering on labor and delivery in stable condition.    Additional Procedures:  None    Poli Kwok [[id number]]      Labor Events    Sac identifier: Sac 1  Rupture date/time: 4/15/2025 1149  Rupture type: Artificial  Fluid color: Clear  Fluid odor: None  Labor type: Induced Onset of Labor  Labor allowed to proceed with plans for an attempted vaginal birth?: Yes  Induction: Misoprostol  First cervical ripening date/time: 2025  Induction date/time: 2025  Induction indications: Risk  Reducing  Complications: None       Labor Event Times    Labor onset date/time: 2025  Dilation complete date/time: 4/15/2025 1650  Start pushing date/time: 4/15/2025 1655       Labor Length    1st stage: 23h 58m  2nd stage: 0h 27m  3rd stage: 0h 05m       Placenta    Placenta delivery date/time: 4/15/2025 172  Placenta removal: Spontaneous  Placenta appearance: Intact       Cord    Vessels: 3 vessels  Complications: None  Delayed cord clamping?: Yes  Cord blood disposition: Lab  Gases sent?: No  Stem cell collection (by provider): No       Lacerations    Episiotomy: None  Perineal laceration: 2nd  Perineal laceration repaired?: Yes  Other lacerations?: No  Repair suture: 2-0 Synthetic Suture       Anesthesia    Method: Epidural       Operative Delivery    Forceps attempted?: No  Vacuum extractor attempted?: No       Shoulder Dystocia    Shoulder dystocia present?: No       [place] Delivery    Time head delivered: 4/15/2025 17:17:00  Birth date/time: 4/15/2025 17:17:00  Delivery type: Vaginal, Spontaneous  Complications: None       Resuscitation    Method: Tactile stimulation, Suctioning       Apgars    Living status: Living  Apgar Component Scores:  1 min.:  5 min.:  10 min.:  15 min.:  20 min.:    Skin color:  0  1       Heart rate:  2  2       Reflex irritability:  2  2       Muscle tone:  2  2       Respiratory effort:  2  2       Total:  8  9       Apgars assigned by: ASHLEY NOWAK RN       Delivery Providers    Delivering clinician: Tone Sarabia MD   Provider Role    La [place] RN Delivery Nurse    Viky Nowak, MEGAN Nursery Nurse    Mk Aj MD Resident

## 2025-04-15 NOTE — ANESTHESIA PREPROCEDURE EVALUATION
Patient: Juliet Kwok    Evaluation Method: In-person visit    Procedure Information    Date: 04/15/25  Procedure: Labor Analgesia         Relevant Problems   Pulmonary   (+) Asthma affecting pregnancy, antepartum ([facility])      Hematology   (+) Iron deficiency anemia during pregnancy      HEENT   (+) Seasonal allergies      GYN   (+) 38 weeks gestation of pregnancy ([facility])       Clinical information reviewed:   Tobacco  Allergies  Meds   Med Hx  Surg Hx   Fam Hx  Soc Hx        NPO Detail:  No data recorded     OB/Gyn Evaluation    Present Pregnancy    Patient is pregnant now.   Obstetric History                Physical Exam    Airway  Mallampati: III     Cardiovascular    Dental    Pulmonary    Abdominal            Anesthesia Plan    History of general anesthesia?: no  History of complications of general anesthesia?: no    ASA 2     epidural

## 2025-04-15 NOTE — SIGNIFICANT EVENT
"Labor Check-in Note    Subjective: Pt resting comfortably in bed, states that she is not feeling her contractions with epidural. Is sleepy, but doing well. Amenable to cervical check and AROM at this time.     Objective:  Vitals: /60   Pulse 88   Temp 36.5 °C (97.7 °F)   Resp 16   Ht 1.803 m (5' 11\")   Wt 108 kg (239 lb 1.4 oz)   LMP 07/15/2024 (Exact Date)   SpO2 97% Comment: probe falling off finger, RN reapplied pulse ox 97%  BMI 33.35 kg/m²     SVE: 4/70/-3  FHT: 130/moderate/+accels/-decels   [place]: q2-3min    A/P:  IOL  Latent labor  Continue pitocin per protocol, currently at 10  AROM'd for clear fluid  Epidural infusing  Next check-in in 4hr or earlier if clinically indicated    Fetal status  CEFM, currently cat I  GBS neg    Pt seen w/ chief MD Maria Dolores Almendarez, MS4  [facility] [employer]    I saw and evaluated the patient with the medical student. I have made any necessary modifications within the body of text.  Mk Aj MD, PGY-3    "

## 2025-04-15 NOTE — SIGNIFICANT EVENT
"Labor Progress Note    Subjective: Patient resting in bed, states she is starting to feel more uncomfortable with pressure during her contractions. Amenable to cervical check and Leopold's at this time.     Objective:  Vitals: /79   Pulse 85   Temp 37.3 °C (99.1 °F) (Oral)   Resp 16   Ht 1.803 m (5' 11\")   Wt 108 kg (239 lb 1.4 oz)   LMP 07/15/2024 (Exact Date)   SpO2 97%   BMI 33.35 kg/m²     SVE: 5/80/-1  FHT: 125/moderate/+accels/-deccels    [place]: q2-2.5    A/P  IOL:  Latent labor but continuing to make cervical change  Continue pitocin per protocol, currently at 16  Epidural infusing  Next check when clinically indicated     Fetal status  CEFM, currently Category I  EFW 7lb by Leopold's  GBS neg    Pt seen w MD Maria Dolores Almendarez, MS4  [facility] [employer]     I saw and evaluated the patient with the medical student. I have made any necessary modifications within the body of text.   Mk Aj MD, PGY-3   "

## 2025-04-15 NOTE — SIGNIFICANT EVENT
"Called to bedside after episode of feeling lightheaded. Reports she was had walked down to the [place] and then started to feel lightheaded and see dark. She was caught by a nurse and put in a wheel chair. Now she reports she is feeling well overall. Denies feeling lightheaded or dizzy. Denies pain.     /55   Pulse 87   Temp 37 °C (98.6 °F) (Temporal)   Resp 18   Ht 1.803 m (5' 11\")   Wt 108 kg (239 lb 1.4 oz)   LMP 07/15/2024 (Exact Date)   SpO2 99%   BMI 33.35 kg/m²   FHT: 140/mod/+accel/-decel  [place]: q5min     A/P:  -CRB in place. Likely vagal response from CRB.   -VS stable   -POCT glu wnl   -no acute intervention at this time given stable vitals, will continue to monitor     Discussed with Dr. Kenny Felton MD  PGY1, Obstetrics and Gynecology    "

## 2025-04-15 NOTE — SIGNIFICANT EVENT
Labor check    S: Patient resting comfortably, feeling tired now. S/p IV pain meds, has taken the edge off contractions.     SVE: 4/70/-3  FHT: 120/mod/+accel/-decel  [place]: q3-4min    A/P: Juliet is a 19 y.o.  at 39w1d undergoing IOL     IOL  - latent labor  - Pitocin started, will up titrate per protocol   - Discussed AROM at this time, patient would like to defer until epidural. Will plan for nap then call anesthesia for epidural.   - GBS neg    Fetal Wellbeing  - CEFM, Cat I currently     Discussed with Dr. Kenny Felton MD  PGY1, Obstetrics and Gynecology

## 2025-04-15 NOTE — ANESTHESIA PROCEDURE NOTES
Epidural Block    Patient location during procedure: floor  Start time: 4/15/2025 9:30 AM  End time: 4/15/2025 9:44 AM  Reason for block: labor analgesia  Staffing  Performed: [facility]   Authorized by: Nic Olmstead DO    Performed by: Meggan Seay MD    Preanesthetic Checklist  Completed: patient identified, IV checked, risks and benefits discussed, surgical consent, pre-op evaluation, timeout performed and sterile techniques followed  Block Timeout  RN/Licensed healthcare professional reads aloud to the Anesthesia provider and entire team: Patient identity, procedure with side and site, patient position, and as applicable the availability of implants/special equipment/special requirements.  Patient on coagulant treatment: no  Timeout performed at: 4/15/2025 9:32 AM  Block Placement  Patient position: sitting  Prep: ChloraPrep  Sterility prep: drape, gloves, hand, mask and cap  Sedation level: no sedation  Patient monitoring: blood pressure, continuous pulse oximetry and heart rate  Approach: midline  Local numbing: lidocaine 1% to skin and subcutaneous tissues  Vertebral space: lumbar  Lumbar location: L3-L4  Epidural  Loss of resistance technique: saline  Guidance: landmark technique        Needle  Needle type: Tuohy   Needle gauge: 17  Needle length: 10 cm  Needle insertion depth: 6 cm  Catheter type: multi-orifice  Catheter size: 19 G  Catheter at skin depth: 10 cm  Catheter securement method: clear occlusive dressing and liquid medical adhesive    Test dose: lidocaine 1.5% with epinephrine 1-to-200,000  Test dose: lidocaine 1.5% with epinephrine 1-to-200,000  Test dose result: no positive test dose    PCEA  Medication concentration used: 0.044% Bupivacaine with 1.25 mcg/mL Fentanyl and 1:[id number] Epinephrine  Dose (mL): 10  Lockout (minutes): 15  1-Hour Limit (boluses/hr): 4  Basal Rate: 14        Assessment  Sensory level: T10  Block outcome: patient comfortable  Number of attempts: 1  Events: no positive test  dose  Procedure assessment: patient tolerated procedure well with no immediate complications  Additional Notes  Easy by CAA, cooperative patient

## 2025-04-16 LAB — FETAL MATERNAL SCREEN: NORMAL

## 2025-04-16 PROCEDURE — 1100000001 HC PRIVATE ROOM DAILY

## 2025-04-16 PROCEDURE — 2500000001 HC RX 250 WO HCPCS SELF ADMINISTERED DRUGS (ALT 637 FOR MEDICARE OP): Mod: SE | Performed by: STUDENT IN AN ORGANIZED HEALTH CARE EDUCATION/TRAINING PROGRAM

## 2025-04-16 PROCEDURE — 3E0234Z INTRODUCTION OF SERUM, TOXOID AND VACCINE INTO MUSCLE, PERCUTANEOUS APPROACH: ICD-10-PCS | Performed by: NURSE PRACTITIONER

## 2025-04-16 PROCEDURE — 2500000004 HC RX 250 GENERAL PHARMACY W/ HCPCS (ALT 636 FOR OP/ED): Mod: JZ,SE | Performed by: NURSE PRACTITIONER

## 2025-04-16 PROCEDURE — 2500000005 HC RX 250 GENERAL PHARMACY W/O HCPCS: Mod: SE | Performed by: STUDENT IN AN ORGANIZED HEALTH CARE EDUCATION/TRAINING PROGRAM

## 2025-04-16 PROCEDURE — 85461 HEMOGLOBIN FETAL: CPT

## 2025-04-16 PROCEDURE — 2500000004 HC RX 250 GENERAL PHARMACY W/ HCPCS (ALT 636 FOR OP/ED): Mod: SE | Performed by: STUDENT IN AN ORGANIZED HEALTH CARE EDUCATION/TRAINING PROGRAM

## 2025-04-16 PROCEDURE — 36415 COLL VENOUS BLD VENIPUNCTURE: CPT | Performed by: NURSE PRACTITIONER

## 2025-04-16 RX ORDER — DOCUSATE SODIUM 100 MG/1
100 CAPSULE, LIQUID FILLED ORAL 2 TIMES DAILY
Status: DISCONTINUED | OUTPATIENT
Start: 2025-04-16 | End: 2025-04-17 | Stop reason: HOSPADM

## 2025-04-16 RX ADMIN — IBUPROFEN 600 MG: 600 TABLET ORAL at 07:16

## 2025-04-16 RX ADMIN — ACETAMINOPHEN 975 MG: 325 TABLET, FILM COATED ORAL at 07:16

## 2025-04-16 RX ADMIN — ACETAMINOPHEN 975 MG: 325 TABLET, FILM COATED ORAL at 18:53

## 2025-04-16 RX ADMIN — IBUPROFEN 600 MG: 600 TABLET ORAL at 01:40

## 2025-04-16 RX ADMIN — FAMOTIDINE 20 MG: 20 TABLET, FILM COATED ORAL at 09:10

## 2025-04-16 RX ADMIN — POLYETHYLENE GLYCOL 3350 17 G: 17 POWDER, FOR SOLUTION ORAL at 12:17

## 2025-04-16 RX ADMIN — IBUPROFEN 600 MG: 600 TABLET ORAL at 18:53

## 2025-04-16 RX ADMIN — ALBUTEROL SULFATE 2 PUFF: 90 AEROSOL, METERED RESPIRATORY (INHALATION) at 18:54

## 2025-04-16 RX ADMIN — WITCH HAZEL 1 EACH: 500 SOLUTION RECTAL; TOPICAL at 07:16

## 2025-04-16 RX ADMIN — CALCIUM CARBONATE 1000 MG: 500 TABLET, CHEWABLE ORAL at 18:53

## 2025-04-16 RX ADMIN — HUMAN RHO(D) IMMUNE GLOBULIN 300 MCG: 300 INJECTION, SOLUTION INTRAMUSCULAR at 12:26

## 2025-04-16 RX ADMIN — ACETAMINOPHEN 975 MG: 325 TABLET, FILM COATED ORAL at 01:40

## 2025-04-16 RX ADMIN — FAMOTIDINE 20 MG: 20 TABLET, FILM COATED ORAL at 21:01

## 2025-04-16 RX ADMIN — IBUPROFEN 600 MG: 600 TABLET ORAL at 12:17

## 2025-04-16 RX ADMIN — ACETAMINOPHEN 975 MG: 325 TABLET, FILM COATED ORAL at 12:17

## 2025-04-16 ASSESSMENT — PAIN SCALES - GENERAL
PAINLEVEL_OUTOF10: 0 - NO PAIN
PAINLEVEL_OUTOF10: 0 - NO PAIN
PAINLEVEL_OUTOF10: 5 - MODERATE PAIN
PAINLEVEL_OUTOF10: 3
PAIN_LEVEL: 0
PAINLEVEL_OUTOF10: 3
PAINLEVEL_OUTOF10: 3

## 2025-04-16 ASSESSMENT — PAIN DESCRIPTION - DESCRIPTORS
DESCRIPTORS: SORE
DESCRIPTORS: SORE

## 2025-04-16 NOTE — CARE PLAN
The patient's goals for the shift include   Problem: Postpartum  Goal: Experiences normal postpartum course  Outcome: Progressing     Problem: Pain - Adult  Goal: Verbalizes/displays adequate comfort level or baseline comfort level  Outcome: Progressing     Problem: Safety - Adult  Goal: Free from fall injury  Outcome: Progressing     Problem: Discharge Planning  Goal: Discharge to home or other facility with appropriate resources  Outcome: Progressing      Pt vss bleeding and swelling normal iv removed formula feeding

## 2025-04-16 NOTE — PROGRESS NOTES
Postpartum Progress Note    Assessment/Plan   Juliet Kwok is a 19 y.o., , who delivered at 39w1d gestation    Now PPD#1 s/p Vaginal, Spontaneous on 4/15/2025  - Continue routine postpartum care  - Pain well controlled on po medications  - DVT risk score DVT Score (IF A SCORE IS NOT CALCULATING, MUST SELECT A BMI TO COMPLETE): 5 , ppx with SCDs, ambulation, and lovenox  - RH negative, rhogam given, fetal maternal screen collected, rhogam ordered and given  - Hgb:   Results from last 7 days   Lab Units 25  1754   HEMOGLOBIN g/dL 9.3*        Acute on chronic blood loss anemia  - Hgb trend as above  - asymptomatic, VSS; for PO Fe on DC  - continue to monitor for s/sx anemia     Elevated BP, no dx gHTN/PEC  - mild range BP on 4/15 and a few mild ranges while in recovery  - asymptomatic   - continue to monitor, if further mild range BP will meet criteria for gHTN   - The signs and symptoms of PEC were reviewed with the patient, including unrelenting headache, vision changes/blurred vision, and pain underneath the right breast.   - BP cuff for home for checking BP BID. Pt instructed to call primary OB if SBP > 160 or DBP > 110.      Maternal Well-Being  - Vitals stable  -mood appropriate  - All questions and concerns address  -pregnancy notable for asthma, weekly albuterol use; chlamydia with neg MARTIN     Feeding  - Breastfeeding/pumping encouraged  - Lactation consult prn    Contraception  - Declines  -discussed pregnancy spacing  - Education provided    Dispo  - Anticipate d/c on PPD #2 if meeting all postpartum milestones  - Follow-up in 4-6wks with primary NATACHA Tillman-CNP     Assessment & Plan  Encounter for induction of labor    Pregnancy Problems (from 24 to present)       Problem Noted Diagnosed Resolved    Encounter for induction of labor 2025 by Flor Felton MD  No    Priority:  Medium       Iron deficiency anemia during pregnancy 3/4/2025 by Flor NGUYEN  MD Yessi  No    Priority:  Medium       Overview Addendum 3/19/2025  7:54 AM by Kristi Nicole MD   - noted on CBC 3/3/25, rx sent 3/4/25  - repeat hgb 8.9 (from 9.1) without increased reticulocytes. For IV iron         Asthma affecting pregnancy, antepartum ([facility]) 2025 by Madai Ko MD  No    Priority:  Medium       Rh negative state in antepartum period ([facility]) 2024 by Madai Ko MD  No    Priority:  Medium       Overview Signed 2/3/2025 12:46 PM by Flor Dickson MD   - Rhogam given 2/3/25         38 weeks gestation of pregnancy ([facility]) 10/7/2024 by Natasha De Leon MD  No    Priority:  Medium       Overview Addendum 3/31/2025 11:49 AM by Madai Ko MD   Desired provider in labor: [x] CNM  [] Physician  [x] Blood Products: [x] Yes, accepts [] No, needs counseling  [x] Initial BMI: Could not be calculated   [x] Prenatal Labs: 10/7  [x] Cervical Cancer Screening: N/A, <20 y/o  [x] Rh status: NEG, needs Rhogam at 28wks  [x] Genetic Screening:  rr cfDNA  [x] NT US: (11-13 wks) nml  [x] Baby ASA (if indicated):  [x] Pregnancy dated by: LMP c/w 6wk US at OSH     [x] Anatomy US: (19-20 wks): - wnl   [x] Federal Sterilization consent signed (if indicated): n/a  [x] 1hr GCT at 24-28wks: drawn 2/3/25  [x] Rhogam (if indicated): 2/3/25  [] Fetal Surveillance (if indicated):  [x] Tdap (27-32 wks, may be given up to 36 wks if initial window missed): 2/3/25  [x] RSV (32-36 wks) (Sept. to end of ): not offered  [x] Flu Vaccine: Declined ,    [x] Breastfeeding: Undecided, leaning toward bottle-feeding. Breast pump ordered  [x] Postpartum Birth control method: leaning towards Nexplanon   [x] GBS at 36 - 37 wks: neg3/24  [] 39 weeks discussion of IOL vs. Expectant management: IOL  [] Mode of delivery (anticipated):  anticipated          Vaginal bleeding in pregnancy, first trimester ([facility]) 10/7/2024 by Natasha De Leon MD  No    Priority:  Medium        Bacterial vaginosis in pregnancy ([facility]) 11/4/2024 by Madai Ko MD  2/3/2025 by Flor Dickson MD    Priority:  Medium       Back pain in pregnancy ([facility]) 11/4/2024 by Madai Ko MD  12/12/2024 by Madai Ko MD    Priority:  Medium       Chlamydia infection affecting pregnancy in first trimester ([facility]) 8/19/2024 by Marli Kidd, [facility]  2/3/2025 by Flor Dickson MD    Priority:  Medium       Overview Addendum 2/3/2025 12:42 PM by Flor Dickson MD   +chlamydia 8/14, treated with azithromycin 8/15/2024  - MARTIN negative in October         Pregnancy of unknown anatomic location ([facility]) 8/19/2024 by Marli Kidd, NATACHA-CN  10/7/2024 by Natasha De Leon MD    Priority:  Medium               Subjective     Juliet Kwok is PPD#1 s/p vaginal delivery who reports feeling overall well.  No acute events overnight.  Voiding spontaneously, passing flatus.  Pain well controlled on PO meds.  Light lochia. Tolerating diet.  Denies HA, SOB, RUQ pain, vision changes.  Denies dizziness/lightheadedness, SOB, palpitations, extreme fatigue, heavy bleeding      Objective   Allergies:   Patient has no known allergies.         Last Vitals:  Temp Pulse Resp BP MAP Pulse Ox   37.6 °C (99.7 °F) 85 16 126/75   98 %     Vitals Min/Max Last 24 Hours:  Temp  Min: 36.2 °C (97.2 °F)  Max: 37.6 °C (99.7 °F)  Pulse  Min: 75  Max: 103  Resp  Min: 16  Max: 18  BP  Min: 102/58  Max: 149/63    Intake/Output:     Intake/Output Summary (Last 24 hours) at 4/16/2025 1526  Last data filed at 4/15/2025 2200  Gross per 24 hour   Intake --   Output 879 ml   Net -879 ml       Physical Exam:  General: examination reveals a well developed, well nourished, female, in no acute distress. She is alert and cooperative.  HEENT: external ears normal. Nose normal, no erythema or discharge.  Neck: supple, no significant adenopathy  Lungs: breathing even and unlabored, lungs clear  Cardiac: warm and well perfused,  heart rate regular  Fundus: firm and below umbilicus, lochia light  Abdominal: soft, non-tender, non-distended, bowel sounds active  Extremities: no redness or tenderness in the calves or thighs, edema: non-pitting BLE  Neurological: alert, oriented, normal speech, no focal findings or movement disorder noted.  Psychological: awake and alert; oriented to person, place, and time.  Skin: no rashes or lesions    Lab Data:  Labs in chart were reviewed.

## 2025-04-16 NOTE — ANESTHESIA POSTPROCEDURE EVALUATION
Patient: Juliet Kwok    Procedure Summary       Date: 04/15/25 Room / Location:     Anesthesia Start: 930 Anesthesia Stop:     Procedure: Labor Analgesia Diagnosis:     Scheduled Providers:  Responsible Provider: Deyanira Sommer MD    Anesthesia Type: epidural ASA Status: 2            Anesthesia Type: epidural    Vitals Value Taken Time   /65 25 08:08   Temp 37.1 °C (98.8 °F) 25 08:08   Pulse 75 25 08:08   Resp 16 25 08:08   SpO2 95 % 25 08:08       Anesthesia Post Evaluation    Patient location during evaluation: bedside  Patient participation: complete - patient participated  Level of consciousness: awake and alert  Pain score: 0  Pain management: adequate  Multimodal analgesia pain management approach  Airway patency: patent  Cardiovascular status: acceptable and blood pressure returned to baseline  Respiratory status: acceptable  Hydration status: acceptable  Postoperative Nausea and Vomiting: none  Comments: Juliet Kwok is a 19 y.o., , who had a Vaginal, Spontaneous delivery on 4/15/2025 at 39w1d and is now POD1.    She had Neuraxial Anesthesia without immediate complications noted.       Pain well controlled    ---------------------------               25                      0808         ---------------------------   BP:          114/65         Pulse:         75           Resp:          16           Temp:   37.1 °C (98.8 °F)   SpO2:          95%         ---------------------------    Neuraxial site assessed. No visible redness or swelling or drainage. Patient able to ambulate and move all extremities without difficulty. Able to void. No complaints of nausea/vomiting. Tolerating PO intake well. No s/sx of PDPH.     Anesthesia will sign off     Marcio Yip MD          No notable events documented.

## 2025-04-16 NOTE — CARE PLAN
The patient's goals for the shift include To rest    The clinical goals for the shift include Lochia to remain light throughout shift    Over the shift, the patient made progress toward the following goals.   Problem: Postpartum  Goal: Experiences normal postpartum course  Outcome: Progressing  Goal: Appropriate maternal -  bonding  Outcome: Progressing  Goal: Establish and maintain infant feeding pattern for adequate nutrition  Outcome: Progressing  Goal: No s/sx infection  Outcome: Progressing  Goal: No s/sx of hemorrhage  Outcome: Progressing  Goal: Minimal s/sx of HDP and BP<160/110  Outcome: Progressing   Vitals are wnl. Minimum pain expressed and treated with cold packs and medicated pads. Patient up ambulating in room. She had a shower and desires rest this shift.

## 2025-04-16 NOTE — DISCHARGE INSTRUCTIONS

## 2025-04-17 ENCOUNTER — PHARMACY VISIT (OUTPATIENT)
Dept: PHARMACY | Facility: CLINIC | Age: 20
End: 2025-04-17
Payer: MEDICAID

## 2025-04-17 VITALS
WEIGHT: 239.09 LBS | HEART RATE: 71 BPM | SYSTOLIC BLOOD PRESSURE: 114 MMHG | OXYGEN SATURATION: 95 % | DIASTOLIC BLOOD PRESSURE: 65 MMHG | BODY MASS INDEX: 33.47 KG/M2 | RESPIRATION RATE: 16 BRPM | TEMPERATURE: 98.4 F | HEIGHT: 71 IN

## 2025-04-17 LAB
BLOOD EXPIRATION DATE: NORMAL
DISPENSE STATUS: NORMAL
PRODUCT BLOOD TYPE: 1700
PRODUCT CODE: NORMAL
UNIT ABO: NORMAL
UNIT NUMBER: NORMAL
UNIT RH: NORMAL
UNIT VOLUME: 350
XM INTEP: NORMAL

## 2025-04-17 PROCEDURE — 2500000001 HC RX 250 WO HCPCS SELF ADMINISTERED DRUGS (ALT 637 FOR MEDICARE OP): Mod: SE | Performed by: STUDENT IN AN ORGANIZED HEALTH CARE EDUCATION/TRAINING PROGRAM

## 2025-04-17 PROCEDURE — 99238 HOSP IP/OBS DSCHRG MGMT 30/<: CPT | Performed by: NURSE PRACTITIONER

## 2025-04-17 PROCEDURE — 2500000004 HC RX 250 GENERAL PHARMACY W/ HCPCS (ALT 636 FOR OP/ED): Mod: SE | Performed by: STUDENT IN AN ORGANIZED HEALTH CARE EDUCATION/TRAINING PROGRAM

## 2025-04-17 PROCEDURE — 2500000001 HC RX 250 WO HCPCS SELF ADMINISTERED DRUGS (ALT 637 FOR MEDICARE OP): Mod: SE

## 2025-04-17 PROCEDURE — 2500000005 HC RX 250 GENERAL PHARMACY W/O HCPCS: Mod: SE | Performed by: STUDENT IN AN ORGANIZED HEALTH CARE EDUCATION/TRAINING PROGRAM

## 2025-04-17 PROCEDURE — 2500000001 HC RX 250 WO HCPCS SELF ADMINISTERED DRUGS (ALT 637 FOR MEDICARE OP): Mod: SE | Performed by: NURSE PRACTITIONER

## 2025-04-17 PROCEDURE — RXMED WILLOW AMBULATORY MEDICATION CHARGE

## 2025-04-17 RX ORDER — ACETAMINOPHEN 325 MG/1
975 TABLET ORAL EVERY 6 HOURS
Qty: 90 TABLET | Refills: 0 | Status: ON HOLD | OUTPATIENT
Start: 2025-04-17

## 2025-04-17 RX ORDER — BUTALBITAL, ACETAMINOPHEN AND CAFFEINE 300; 40; 50 MG/1; MG/1; MG/1
1 CAPSULE ORAL EVERY 4 HOURS PRN
Status: DISCONTINUED | OUTPATIENT
Start: 2025-04-17 | End: 2025-04-17

## 2025-04-17 RX ORDER — IBUPROFEN 600 MG/1
600 TABLET ORAL EVERY 6 HOURS
Qty: 30 TABLET | Refills: 0 | Status: ON HOLD | OUTPATIENT
Start: 2025-04-17

## 2025-04-17 RX ORDER — BUTALBITAL, ACETAMINOPHEN AND CAFFEINE 50; 325; 40 MG/1; MG/1; MG/1
1 TABLET ORAL EVERY 4 HOURS PRN
Status: DISCONTINUED | OUTPATIENT
Start: 2025-04-17 | End: 2025-04-17 | Stop reason: HOSPADM

## 2025-04-17 RX ADMIN — FAMOTIDINE 20 MG: 20 TABLET, FILM COATED ORAL at 09:04

## 2025-04-17 RX ADMIN — BUTALBITAL, ACETAMINOPHEN, AND CAFFEINE 1 TABLET: 325; 50; 40 TABLET ORAL at 10:15

## 2025-04-17 RX ADMIN — DOCUSATE SODIUM 100 MG: 100 CAPSULE, LIQUID FILLED ORAL at 00:21

## 2025-04-17 RX ADMIN — IBUPROFEN 600 MG: 600 TABLET ORAL at 12:30

## 2025-04-17 RX ADMIN — POLYETHYLENE GLYCOL 3350 17 G: 17 POWDER, FOR SOLUTION ORAL at 00:21

## 2025-04-17 RX ADMIN — ACETAMINOPHEN 975 MG: 325 TABLET, FILM COATED ORAL at 12:30

## 2025-04-17 RX ADMIN — IBUPROFEN 600 MG: 600 TABLET ORAL at 00:22

## 2025-04-17 RX ADMIN — ACETAMINOPHEN 975 MG: 325 TABLET, FILM COATED ORAL at 00:21

## 2025-04-17 RX ADMIN — ACETAMINOPHEN 975 MG: 325 TABLET, FILM COATED ORAL at 06:30

## 2025-04-17 RX ADMIN — DOCUSATE SODIUM 100 MG: 100 CAPSULE, LIQUID FILLED ORAL at 09:03

## 2025-04-17 RX ADMIN — IBUPROFEN 600 MG: 600 TABLET ORAL at 06:30

## 2025-04-17 RX ADMIN — WITCH HAZEL 1 EACH: 500 SOLUTION RECTAL; TOPICAL at 01:13

## 2025-04-17 ASSESSMENT — PAIN DESCRIPTION - LOCATION: LOCATION: PERINEUM

## 2025-04-17 ASSESSMENT — PAIN SCALES - GENERAL
PAINLEVEL_OUTOF10: 6
PAINLEVEL_OUTOF10: 9
PAINLEVEL_OUTOF10: 0 - NO PAIN

## 2025-04-17 NOTE — CARE PLAN
The patient's goals for the shift include rest    The clinical goals for the shift include light to scant lochia      Problem: Postpartum  Goal: Experiences normal postpartum course  Outcome: Progressing     Problem: Pain - Adult  Goal: Verbalizes/displays adequate comfort level or baseline comfort level  Outcome: Progressing     Problem: Safety - Adult  Goal: Free from fall injury  Outcome: Progressing     Problem: Discharge Planning  Goal: Discharge to home or other facility with appropriate resources  Outcome: Progressing  Patient has had stable VS and assessments, pain well controlled and bleeding has been appropriate during this shift.

## 2025-04-17 NOTE — PROGRESS NOTES
Social Work Assessment     Patient: Juliet Kwok, 18yo,   Address: [address] [place] [address]  Phone: [phone]    Referral Reason: assessment - hx of housing concerns?    Prenatal Care: [facility] x 12  Barriers: none     Name: Abby Kwok, MR# [id number]   : 4/15/25    Other Children: none    FOB: Ms Kwok identifies FOB as Héctor Monsalve. She states he is involved and helpful but is waiting for paternity test.     Household Composition: Ms Kwok reports she lives alone but states NEVILLEB stays with her most of the time. She states her mother is her primary support, states FOB's family is also supportive. Ms Kwok states she will go to her mom's house for 2 weeks for additional support and while she is healing and adjusting.     IPV/DV or Safety Concerns: Ms Kwok denies IPV/safety concerns at this time.     Car-Seat: yes - from [place] program, in room  Safe Sleep Space: yes - bassinet (reports)  Safe Sleep Education: reviewed    School/Work/Income: Ms Kwok reports she receives food stamps, medical benefits, and vocaltap. She denies food/transportation/financial concerns.     Mental Health Diagnoses/Concerns: Ms Kwok denies signs and symptoms of depression and anxiety. She was aware of symptoms of baby blues and postpartum depression. She states she would talk to her mom and her mom would notice if she had symptoms. SW reviewed postpartum depression signs, symptoms, and resources and Ms Kwok indicated understanding.    Bonding: No concerns noted.     Assessment:  SW met with Ms Kwok for assessment. She was accepting and engaged. She reports she has needed items for  and good support. Safe sleep and postpartum education provided. No concerns noted.     Plan: Ms Kwok and  clear from SW perspective.    Signature: DWIGHT Santos

## 2025-04-17 NOTE — DISCHARGE SUMMARY
"Discharge Summary    Admission Date: 4/14/2025  Discharge Date: 04/17/25      Discharge Diagnosis  Encounter for induction of labor    Hospital Course  Delivery Date: 4/15/2025 5:17 PM  Delivery type: Vaginal, Spontaneous   GA at delivery: 39w1d  Outcome: Living  Anesthesia during delivery: Epidural  Intrapartum complications: None  Feeding method: Breastfeeding Status: No     Procedures: none  Contraception at discharge: none  No questions or complaints.  Hx. Of migraines, ha earlier this a.m. that she \"slept off\".    Last Vitals:  Temp Pulse Resp BP MAP Pulse Ox   36.9 °C (98.4 °F) 71 16 114/65 81 95 %     Discharge Meds     Your medication list        START taking these medications        Instructions Last Dose Given Next Dose Due   acetaminophen 325 mg tablet  Commonly known as: Tylenol      Take 3 tablets (975 mg) by mouth every 6 hours.       ibuprofen 600 mg tablet      Take 1 tablet (600 mg) by mouth every 6 hours.              CONTINUE taking these medications        Instructions Last Dose Given Next Dose Due   famotidine 20 mg tablet  Commonly known as: Pepcid      Take 1 tablet (20 mg) by mouth 2 times a day. For heartburn       FeroSuL 325 mg (65 mg iron) tablet  Generic drug: ferrous sulfate      Take 1 tablet (325 mg) by mouth 2 times a day.       polyethylene glycol 17 gram packet  Commonly known as: Glycolax, Miralax      Take 17 g by mouth once daily.       Symbicort 160-4.5 mcg/actuation inhaler  Generic drug: budesonide-formoterol      Inhale 2 puffs 2 times a day. Rinse mouth with water after use to reduce aftertaste and incidence of candidiasis. Do not swallow.              STOP taking these medications      albuterol 2.5 mg /3 mL (0.083 %) nebulizer solution        albuterol 90 mcg/actuation inhaler        aspirin 81 mg EC tablet        calcium carbonate 500 mg (200 mg elemental) chewable tablet  Commonly known as: Tums        docosanol cream 10 % cream cream  Commonly known as: Abreva      "   lidocaine 5 % patch  Commonly known as: Lidoderm        loratadine 10 mg tablet  Commonly known as: Claritin        Prenatal Vitamin 27 mg iron- 0.8 mg tablet  Generic drug: prenatal vitamin (iron-folic)                  Where to Get Your Medications        These medications were [facility]  [address] [facility] [address]      Hours: 8:30 AM to 5 PM Mon-Fri Phone: [phone]   acetaminophen 325 mg tablet  ibuprofen 600 mg tablet          Complications Requiring Follow-Up  Heavy vaginal bleeding, passing clots, fever and/or chills      Test Results Pending At Discharge  Pending Labs       No current pending labs.            Outpatient Follow-Up  Future Appointments   Date Time Provider Department Center   6/5/2025  2:45 PM Madai Ko MD [id number]       I spent 10 minutes in the professional and overall care of this patient.      Elizabeth Richey, APRN-CNP

## 2025-04-17 NOTE — CARE PLAN
The patient's goals for the shift include prepare for discharge    The clinical goals for the shift include maintain stable vital signs    Vital signs stable throughout the shift, lochia has been WNL, and patient is without s/s of HDP. Her headache was resolved with medication and rest. Patient is bonding well with her !    Problem: Postpartum  Goal: Experiences normal postpartum course  Outcome: Adequate for Discharge     Problem: Pain - Adult  Goal: Verbalizes/displays adequate comfort level or baseline comfort level  Outcome: Adequate for Discharge     Problem: Safety - Adult  Goal: Free from fall injury  Outcome: Adequate for Discharge     Problem: Discharge Planning  Goal: Discharge to home or other facility with appropriate resources  Outcome: Adequate for Discharge

## 2025-04-26 ENCOUNTER — HOSPITAL ENCOUNTER (INPATIENT)
Facility: HOSPITAL | Age: 20
End: 2025-04-26
Attending: OBSTETRICS & GYNECOLOGY
Payer: COMMERCIAL

## 2025-04-26 PROBLEM — O13.9 GESTATIONAL HYPERTENSION (HHS-HCC): Status: ACTIVE | Noted: 2025-04-26

## 2025-04-26 LAB
ABO GROUP (TYPE) IN BLOOD: NORMAL
ALBUMIN SERPL BCP-MCNC: 4.2 G/DL (ref 3.4–5)
ALP SERPL-CCNC: 139 U/L (ref 33–110)
ALT SERPL W P-5'-P-CCNC: 17 U/L (ref 7–45)
ANION GAP SERPL CALC-SCNC: 19 MMOL/L (ref 10–20)
ANTIBODY SCREEN: NORMAL
APTT PPP: 26 SECONDS (ref 26–36)
AST SERPL W P-5'-P-CCNC: 15 U/L (ref 9–39)
BASOPHILS # BLD AUTO: 0.03 X10*3/UL (ref 0–0.1)
BASOPHILS NFR BLD AUTO: 0.5 %
BILIRUB SERPL-MCNC: 0.5 MG/DL (ref 0–1.2)
BNP SERPL-MCNC: 45 PG/ML (ref 0–99)
BUN SERPL-MCNC: 9 MG/DL (ref 6–23)
BURR CELLS BLD QL SMEAR: NORMAL
CALCIUM SERPL-MCNC: 9.1 MG/DL (ref 8.6–10.6)
CARDIAC TROPONIN I PNL SERPL HS: 7 NG/L (ref 0–34)
CHLORIDE SERPL-SCNC: 103 MMOL/L (ref 98–107)
CO2 SERPL-SCNC: 20 MMOL/L (ref 21–32)
CREAT SERPL-MCNC: 0.77 MG/DL (ref 0.5–1.05)
EGFRCR SERPLBLD CKD-EPI 2021: >90 ML/MIN/1.73M*2
EOSINOPHIL # BLD AUTO: 0.08 X10*3/UL (ref 0–0.7)
EOSINOPHIL NFR BLD AUTO: 1.3 %
ERYTHROCYTE [DISTWIDTH] IN BLOOD BY AUTOMATED COUNT: 16.6 % (ref 11.5–14.5)
FIBRINOGEN PPP-MCNC: 331 MG/DL (ref 200–400)
FLUAV RNA RESP QL NAA+PROBE: NOT DETECTED
FLUBV RNA RESP QL NAA+PROBE: NOT DETECTED
GLUCOSE SERPL-MCNC: 82 MG/DL (ref 74–99)
HCT VFR BLD AUTO: 35.9 % (ref 36–46)
HGB BLD-MCNC: 11.5 G/DL (ref 12–16)
IMM GRANULOCYTES # BLD AUTO: 0.06 X10*3/UL (ref 0–0.7)
IMM GRANULOCYTES NFR BLD AUTO: 1 % (ref 0–0.9)
INR PPP: 1.1 (ref 0.9–1.1)
LACTATE SERPL-SCNC: 1.8 MMOL/L (ref 0.4–2)
LYMPHOCYTES # BLD AUTO: 0.72 X10*3/UL (ref 1.2–4.8)
LYMPHOCYTES NFR BLD AUTO: 11.4 %
MCH RBC QN AUTO: 23.7 PG (ref 26–34)
MCHC RBC AUTO-ENTMCNC: 32 G/DL (ref 32–36)
MCV RBC AUTO: 74 FL (ref 80–100)
MONOCYTES # BLD AUTO: 0.03 X10*3/UL (ref 0.1–1)
MONOCYTES NFR BLD AUTO: 0.5 %
NEUTROPHILS # BLD AUTO: 5.38 X10*3/UL (ref 1.2–7.7)
NEUTROPHILS NFR BLD AUTO: 85.3 %
NRBC BLD-RTO: 0 /100 WBCS (ref 0–0)
PLATELET # BLD AUTO: 442 X10*3/UL (ref 150–450)
POC APPEARANCE, URINE: CLEAR
POC BILIRUBIN, URINE: NEGATIVE
POC BLOOD, URINE: ABNORMAL
POC COLOR, URINE: YELLOW
POC GLUCOSE, URINE: NEGATIVE MG/DL
POC KETONES, URINE: NEGATIVE MG/DL
POC LEUKOCYTES, URINE: ABNORMAL
POC NITRITE,URINE: NEGATIVE
POC PH, URINE: 6 PH
POC PROTEIN, URINE: ABNORMAL MG/DL
POC SPECIFIC GRAVITY, URINE: 1.01
POC UROBILINOGEN, URINE: 1 EU/DL
POTASSIUM SERPL-SCNC: 4.2 MMOL/L (ref 3.5–5.3)
PROT SERPL-MCNC: 7.9 G/DL (ref 6.4–8.2)
PROTHROMBIN TIME: 12.7 SECONDS (ref 9.8–12.4)
RBC # BLD AUTO: 4.85 X10*6/UL (ref 4–5.2)
RBC MORPH BLD: NORMAL
RH FACTOR (ANTIGEN D): NORMAL
SARS-COV-2 RNA RESP QL NAA+PROBE: NOT DETECTED
SCHISTOCYTES BLD QL SMEAR: NORMAL
SODIUM SERPL-SCNC: 138 MMOL/L (ref 136–145)
WBC # BLD AUTO: 6.3 X10*3/UL (ref 4.4–11.3)

## 2025-04-26 PROCEDURE — 84484 ASSAY OF TROPONIN QUANT: CPT

## 2025-04-26 PROCEDURE — 80053 COMPREHEN METABOLIC PANEL: CPT

## 2025-04-26 PROCEDURE — 86077 PHYS BLOOD BANK SERV XMATCH: CPT

## 2025-04-26 PROCEDURE — 85025 COMPLETE CBC W/AUTO DIFF WBC: CPT

## 2025-04-26 PROCEDURE — 85384 FIBRINOGEN ACTIVITY: CPT

## 2025-04-26 PROCEDURE — 36415 COLL VENOUS BLD VENIPUNCTURE: CPT

## 2025-04-26 PROCEDURE — 2500000004 HC RX 250 GENERAL PHARMACY W/ HCPCS (ALT 636 FOR OP/ED): Mod: SE,JZ | Performed by: STUDENT IN AN ORGANIZED HEALTH CARE EDUCATION/TRAINING PROGRAM

## 2025-04-26 PROCEDURE — 4500999001 HC ED NO CHARGE

## 2025-04-26 PROCEDURE — 2500000001 HC RX 250 WO HCPCS SELF ADMINISTERED DRUGS (ALT 637 FOR MEDICARE OP): Mod: SE | Performed by: STUDENT IN AN ORGANIZED HEALTH CARE EDUCATION/TRAINING PROGRAM

## 2025-04-26 PROCEDURE — 87636 SARSCOV2 & INF A&B AMP PRB: CPT | Performed by: STUDENT IN AN ORGANIZED HEALTH CARE EDUCATION/TRAINING PROGRAM

## 2025-04-26 PROCEDURE — 2500000004 HC RX 250 GENERAL PHARMACY W/ HCPCS (ALT 636 FOR OP/ED): Mod: SE | Performed by: STUDENT IN AN ORGANIZED HEALTH CARE EDUCATION/TRAINING PROGRAM

## 2025-04-26 PROCEDURE — 81002 URINALYSIS NONAUTO W/O SCOPE: CPT

## 2025-04-26 PROCEDURE — G0378 HOSPITAL OBSERVATION PER HR: HCPCS

## 2025-04-26 PROCEDURE — 99213 OFFICE O/P EST LOW 20 MIN: CPT

## 2025-04-26 PROCEDURE — 99222 1ST HOSP IP/OBS MODERATE 55: CPT

## 2025-04-26 PROCEDURE — 87086 URINE CULTURE/COLONY COUNT: CPT

## 2025-04-26 PROCEDURE — 2500000001 HC RX 250 WO HCPCS SELF ADMINISTERED DRUGS (ALT 637 FOR MEDICARE OP): Mod: SE

## 2025-04-26 PROCEDURE — 1210000001 HC SEMI-PRIVATE ROOM DAILY

## 2025-04-26 PROCEDURE — 86901 BLOOD TYPING SEROLOGIC RH(D): CPT

## 2025-04-26 PROCEDURE — 85610 PROTHROMBIN TIME: CPT

## 2025-04-26 PROCEDURE — 87040 BLOOD CULTURE FOR BACTERIA: CPT

## 2025-04-26 PROCEDURE — 86870 RBC ANTIBODY IDENTIFICATION: CPT

## 2025-04-26 PROCEDURE — 83880 ASSAY OF NATRIURETIC PEPTIDE: CPT

## 2025-04-26 PROCEDURE — 2500000004 HC RX 250 GENERAL PHARMACY W/ HCPCS (ALT 636 FOR OP/ED): Mod: SE,JZ

## 2025-04-26 PROCEDURE — 83605 ASSAY OF LACTIC ACID: CPT

## 2025-04-26 RX ORDER — MISOPROSTOL 200 UG/1
800 TABLET ORAL ONCE AS NEEDED
Status: DISCONTINUED | OUTPATIENT
Start: 2025-04-26 | End: 2025-04-28 | Stop reason: HOSPADM

## 2025-04-26 RX ORDER — POLYETHYLENE GLYCOL 3350 17 G/17G
17 POWDER, FOR SOLUTION ORAL 2 TIMES DAILY PRN
Status: DISCONTINUED | OUTPATIENT
Start: 2025-04-26 | End: 2025-04-28 | Stop reason: HOSPADM

## 2025-04-26 RX ORDER — OXYTOCIN/0.9 % SODIUM CHLORIDE 30/500 ML
60 PLASTIC BAG, INJECTION (ML) INTRAVENOUS ONCE AS NEEDED
Status: DISCONTINUED | OUTPATIENT
Start: 2025-04-26 | End: 2025-04-28 | Stop reason: HOSPADM

## 2025-04-26 RX ORDER — VANCOMYCIN HYDROCHLORIDE 1 G/20ML
INJECTION, POWDER, LYOPHILIZED, FOR SOLUTION INTRAVENOUS DAILY PRN
Status: DISCONTINUED | OUTPATIENT
Start: 2025-04-26 | End: 2025-04-26

## 2025-04-26 RX ORDER — LABETALOL HYDROCHLORIDE 5 MG/ML
20 INJECTION, SOLUTION INTRAVENOUS ONCE AS NEEDED
Status: DISCONTINUED | OUTPATIENT
Start: 2025-04-26 | End: 2025-04-28 | Stop reason: HOSPADM

## 2025-04-26 RX ORDER — SODIUM CHLORIDE, SODIUM LACTATE, POTASSIUM CHLORIDE, CALCIUM CHLORIDE 600; 310; 30; 20 MG/100ML; MG/100ML; MG/100ML; MG/100ML
75 INJECTION, SOLUTION INTRAVENOUS CONTINUOUS
Status: DISCONTINUED | OUTPATIENT
Start: 2025-04-26 | End: 2025-04-26

## 2025-04-26 RX ORDER — CARBOPROST TROMETHAMINE 250 UG/ML
250 INJECTION, SOLUTION INTRAMUSCULAR ONCE AS NEEDED
Status: DISCONTINUED | OUTPATIENT
Start: 2025-04-26 | End: 2025-04-28 | Stop reason: HOSPADM

## 2025-04-26 RX ORDER — OXYTOCIN 10 [USP'U]/ML
10 INJECTION, SOLUTION INTRAMUSCULAR; INTRAVENOUS ONCE AS NEEDED
Status: DISCONTINUED | OUTPATIENT
Start: 2025-04-26 | End: 2025-04-28 | Stop reason: HOSPADM

## 2025-04-26 RX ORDER — ALBUTEROL SULFATE 90 UG/1
2 INHALANT RESPIRATORY (INHALATION) EVERY 4 HOURS PRN
Status: DISCONTINUED | OUTPATIENT
Start: 2025-04-26 | End: 2025-04-28 | Stop reason: HOSPADM

## 2025-04-26 RX ORDER — CYCLOBENZAPRINE HCL 10 MG
5 TABLET ORAL ONCE
Status: COMPLETED | OUTPATIENT
Start: 2025-04-26 | End: 2025-04-26

## 2025-04-26 RX ORDER — ACETAMINOPHEN 325 MG/1
975 TABLET ORAL EVERY 6 HOURS
Status: DISCONTINUED | OUTPATIENT
Start: 2025-04-26 | End: 2025-04-28 | Stop reason: HOSPADM

## 2025-04-26 RX ORDER — LOPERAMIDE HYDROCHLORIDE 2 MG/1
4 CAPSULE ORAL EVERY 2 HOUR PRN
Status: DISCONTINUED | OUTPATIENT
Start: 2025-04-26 | End: 2025-04-28 | Stop reason: HOSPADM

## 2025-04-26 RX ORDER — METHYLERGONOVINE MALEATE 0.2 MG/ML
0.2 INJECTION INTRAVENOUS ONCE AS NEEDED
Status: DISCONTINUED | OUTPATIENT
Start: 2025-04-26 | End: 2025-04-28 | Stop reason: HOSPADM

## 2025-04-26 RX ORDER — IBUPROFEN 600 MG/1
600 TABLET, FILM COATED ORAL EVERY 6 HOURS
Status: DISCONTINUED | OUTPATIENT
Start: 2025-04-26 | End: 2025-04-28 | Stop reason: HOSPADM

## 2025-04-26 RX ORDER — ADHESIVE BANDAGE
10 BANDAGE TOPICAL
Status: DISCONTINUED | OUTPATIENT
Start: 2025-04-26 | End: 2025-04-28 | Stop reason: HOSPADM

## 2025-04-26 RX ORDER — SIMETHICONE 80 MG
80 TABLET,CHEWABLE ORAL 4 TIMES DAILY PRN
Status: DISCONTINUED | OUTPATIENT
Start: 2025-04-26 | End: 2025-04-28 | Stop reason: HOSPADM

## 2025-04-26 RX ORDER — TRANEXAMIC ACID 1 G/10ML
1000 INJECTION, SOLUTION INTRAVENOUS ONCE AS NEEDED
Status: DISCONTINUED | OUTPATIENT
Start: 2025-04-26 | End: 2025-04-28 | Stop reason: HOSPADM

## 2025-04-26 RX ORDER — ONDANSETRON HYDROCHLORIDE 2 MG/ML
4 INJECTION, SOLUTION INTRAVENOUS EVERY 6 HOURS PRN
Status: DISCONTINUED | OUTPATIENT
Start: 2025-04-26 | End: 2025-04-28 | Stop reason: HOSPADM

## 2025-04-26 RX ORDER — CLINDAMYCIN PHOSPHATE 900 MG/50ML
900 INJECTION, SOLUTION INTRAVENOUS EVERY 8 HOURS
Status: DISCONTINUED | OUTPATIENT
Start: 2025-04-26 | End: 2025-04-28

## 2025-04-26 RX ORDER — HYDRALAZINE HYDROCHLORIDE 20 MG/ML
5 INJECTION INTRAMUSCULAR; INTRAVENOUS ONCE AS NEEDED
Status: DISCONTINUED | OUTPATIENT
Start: 2025-04-26 | End: 2025-04-28 | Stop reason: HOSPADM

## 2025-04-26 RX ORDER — FLUTICASONE FUROATE AND VILANTEROL 100; 25 UG/1; UG/1
1 POWDER RESPIRATORY (INHALATION)
Status: DISCONTINUED | OUTPATIENT
Start: 2025-04-26 | End: 2025-04-28 | Stop reason: HOSPADM

## 2025-04-26 RX ORDER — METOCLOPRAMIDE 10 MG/1
10 TABLET ORAL ONCE
Status: COMPLETED | OUTPATIENT
Start: 2025-04-26 | End: 2025-04-26

## 2025-04-26 RX ORDER — ONDANSETRON 4 MG/1
4 TABLET, FILM COATED ORAL EVERY 6 HOURS PRN
Status: DISCONTINUED | OUTPATIENT
Start: 2025-04-26 | End: 2025-04-28 | Stop reason: HOSPADM

## 2025-04-26 RX ORDER — POLYETHYLENE GLYCOL 3350 17 G/17G
17 POWDER, FOR SOLUTION ORAL DAILY
Status: DISCONTINUED | OUTPATIENT
Start: 2025-04-26 | End: 2025-04-28 | Stop reason: HOSPADM

## 2025-04-26 RX ORDER — SODIUM CHLORIDE, SODIUM LACTATE, POTASSIUM CHLORIDE, CALCIUM CHLORIDE 600; 310; 30; 20 MG/100ML; MG/100ML; MG/100ML; MG/100ML
75 INJECTION, SOLUTION INTRAVENOUS CONTINUOUS
Status: DISCONTINUED | OUTPATIENT
Start: 2025-04-26 | End: 2025-04-27

## 2025-04-26 RX ORDER — DIPHENHYDRAMINE HCL 25 MG
25 CAPSULE ORAL ONCE
Status: COMPLETED | OUTPATIENT
Start: 2025-04-26 | End: 2025-04-26

## 2025-04-26 RX ORDER — FAMOTIDINE 20 MG/1
20 TABLET, FILM COATED ORAL 2 TIMES DAILY
Status: DISCONTINUED | OUTPATIENT
Start: 2025-04-26 | End: 2025-04-28 | Stop reason: HOSPADM

## 2025-04-26 RX ADMIN — SODIUM CHLORIDE, SODIUM LACTATE, POTASSIUM CHLORIDE, AND CALCIUM CHLORIDE 75 ML/HR: .6; .31; .03; .02 INJECTION, SOLUTION INTRAVENOUS at 22:27

## 2025-04-26 RX ADMIN — SODIUM CHLORIDE, SODIUM LACTATE, POTASSIUM CHLORIDE, AND CALCIUM CHLORIDE 75 ML/HR: .6; .31; .03; .02 INJECTION, SOLUTION INTRAVENOUS at 15:18

## 2025-04-26 RX ADMIN — CYCLOBENZAPRINE 5 MG: 10 TABLET, FILM COATED ORAL at 18:05

## 2025-04-26 RX ADMIN — METOCLOPRAMIDE 10 MG: 10 TABLET ORAL at 18:05

## 2025-04-26 RX ADMIN — IBUPROFEN 600 MG: 600 TABLET ORAL at 15:07

## 2025-04-26 RX ADMIN — SODIUM CHLORIDE, SODIUM LACTATE, POTASSIUM CHLORIDE, AND CALCIUM CHLORIDE 1000 ML: .6; .31; .03; .02 INJECTION, SOLUTION INTRAVENOUS at 15:06

## 2025-04-26 RX ADMIN — IBUPROFEN 600 MG: 600 TABLET ORAL at 21:07

## 2025-04-26 RX ADMIN — ACETAMINOPHEN 975 MG: 325 TABLET, FILM COATED ORAL at 15:07

## 2025-04-26 RX ADMIN — FAMOTIDINE 20 MG: 20 TABLET, FILM COATED ORAL at 21:08

## 2025-04-26 RX ADMIN — PIPERACILLIN SODIUM AND TAZOBACTAM SODIUM 4.5 G: 4; .5 INJECTION, SOLUTION INTRAVENOUS at 15:17

## 2025-04-26 RX ADMIN — ACETAMINOPHEN 975 MG: 325 TABLET, FILM COATED ORAL at 21:07

## 2025-04-26 RX ADMIN — VANCOMYCIN HYDROCHLORIDE 1500 MG: 1.5 INJECTION, POWDER, LYOPHILIZED, FOR SOLUTION INTRAVENOUS at 16:57

## 2025-04-26 RX ADMIN — GENTAMICIN SULFATE 400 MG: 40 INJECTION, SOLUTION INTRAMUSCULAR; INTRAVENOUS at 19:09

## 2025-04-26 RX ADMIN — FLUTICASONE FUROATE AND VILANTEROL TRIFENATATE 1 PUFF: 100; 25 POWDER RESPIRATORY (INHALATION) at 16:59

## 2025-04-26 RX ADMIN — DIPHENHYDRAMINE HYDROCHLORIDE 25 MG: 25 CAPSULE ORAL at 18:05

## 2025-04-26 RX ADMIN — CLINDAMYCIN PHOSPHATE 900 MG: 900 INJECTION, SOLUTION INTRAVENOUS at 18:30

## 2025-04-26 SDOH — HEALTH STABILITY: MENTAL HEALTH: NON-SPECIFIC ACTIVE SUICIDAL THOUGHTS (PAST 1 MONTH): NO

## 2025-04-26 SDOH — SOCIAL STABILITY: SOCIAL INSECURITY: ABUSE SCREEN: ADULT

## 2025-04-26 SDOH — HEALTH STABILITY: MENTAL HEALTH: SUICIDAL BEHAVIOR (LIFETIME): NO

## 2025-04-26 SDOH — SOCIAL STABILITY: SOCIAL INSECURITY: WITHIN THE LAST YEAR, HAVE YOU BEEN HUMILIATED OR EMOTIONALLY ABUSED IN OTHER WAYS BY YOUR PARTNER OR EX-PARTNER?: NO

## 2025-04-26 SDOH — SOCIAL STABILITY: SOCIAL INSECURITY: PHYSICAL ABUSE: DENIES

## 2025-04-26 SDOH — ECONOMIC STABILITY: FOOD INSECURITY: WITHIN THE PAST 12 MONTHS, THE FOOD YOU BOUGHT JUST DIDN'T LAST AND YOU DIDN'T HAVE MONEY TO GET MORE.: NEVER TRUE

## 2025-04-26 SDOH — SOCIAL STABILITY: SOCIAL INSECURITY: HAVE YOU HAD ANY THOUGHTS OF HARMING ANYONE ELSE?: NO

## 2025-04-26 SDOH — ECONOMIC STABILITY: HOUSING INSECURITY: DO YOU FEEL UNSAFE GOING BACK TO THE PLACE WHERE YOU ARE LIVING?: NO

## 2025-04-26 SDOH — HEALTH STABILITY: MENTAL HEALTH: WISH TO BE DEAD (PAST 1 MONTH): NO

## 2025-04-26 SDOH — SOCIAL STABILITY: SOCIAL INSECURITY: HAS ANYONE EVER THREATENED TO HURT YOUR FAMILY OR YOUR PETS?: NO

## 2025-04-26 SDOH — HEALTH STABILITY: MENTAL HEALTH: HAVE YOU USED ANY SUBSTANCES (CANABIS, COCAINE, HEROIN, HALLUCINOGENS, INHALANTS, ETC.) IN THE PAST 12 MONTHS?: NO

## 2025-04-26 SDOH — ECONOMIC STABILITY: FOOD INSECURITY: WITHIN THE PAST 12 MONTHS, YOU WORRIED THAT YOUR FOOD WOULD RUN OUT BEFORE YOU GOT THE MONEY TO BUY MORE.: NEVER TRUE

## 2025-04-26 SDOH — SOCIAL STABILITY: SOCIAL INSECURITY: HAVE YOU HAD THOUGHTS OF HARMING ANYONE ELSE?: NO

## 2025-04-26 SDOH — SOCIAL STABILITY: SOCIAL INSECURITY: WITHIN THE LAST YEAR, HAVE YOU BEEN AFRAID OF YOUR PARTNER OR EX-PARTNER?: NO

## 2025-04-26 SDOH — HEALTH STABILITY: MENTAL HEALTH: HAVE YOU USED ANY PRESCRIPTION DRUGS OTHER THAN PRESCRIBED IN THE PAST 12 MONTHS?: NO

## 2025-04-26 SDOH — SOCIAL STABILITY: SOCIAL INSECURITY: DOES ANYONE TRY TO KEEP YOU FROM HAVING/CONTACTING OTHER FRIENDS OR DOING THINGS OUTSIDE YOUR HOME?: NO

## 2025-04-26 SDOH — SOCIAL STABILITY: SOCIAL INSECURITY: DO YOU FEEL ANYONE HAS EXPLOITED OR TAKEN ADVANTAGE OF YOU FINANCIALLY OR OF YOUR PERSONAL PROPERTY?: NO

## 2025-04-26 SDOH — SOCIAL STABILITY: SOCIAL INSECURITY: ARE YOU OR HAVE YOU BEEN THREATENED OR ABUSED PHYSICALLY, EMOTIONALLY, OR SEXUALLY BY ANYONE?: NO

## 2025-04-26 SDOH — HEALTH STABILITY: MENTAL HEALTH: WERE YOU ABLE TO COMPLETE ALL THE BEHAVIORAL HEALTH SCREENINGS?: YES

## 2025-04-26 SDOH — SOCIAL STABILITY: SOCIAL INSECURITY: VERBAL ABUSE: DENIES

## 2025-04-26 SDOH — SOCIAL STABILITY: SOCIAL INSECURITY: ARE THERE ANY APPARENT SIGNS OF INJURIES/BEHAVIORS THAT COULD BE RELATED TO ABUSE/NEGLECT?: NO

## 2025-04-26 ASSESSMENT — PAIN SCALES - GENERAL
PAINLEVEL_OUTOF10: 10 - WORST POSSIBLE PAIN
PAINLEVEL_OUTOF10: 6
PAINLEVEL_OUTOF10: 10 - WORST POSSIBLE PAIN
PAINLEVEL_OUTOF10: 0 - NO PAIN
PAINLEVEL_OUTOF10: 0 - NO PAIN
PAINLEVEL_OUTOF10: 10 - WORST POSSIBLE PAIN
PAINLEVEL_OUTOF10: 6
PAINLEVEL_OUTOF10: 10 - WORST POSSIBLE PAIN
PAINLEVEL_OUTOF10: 6
PAINLEVEL_OUTOF10: 10 - WORST POSSIBLE PAIN
PAINLEVEL_OUTOF10: 6
PAINLEVEL_OUTOF10: 0 - NO PAIN
PAINLEVEL_OUTOF10: 6

## 2025-04-26 ASSESSMENT — LIFESTYLE VARIABLES
HOW MANY STANDARD DRINKS CONTAINING ALCOHOL DO YOU HAVE ON A TYPICAL DAY: PATIENT DOES NOT DRINK
AUDIT-C TOTAL SCORE: 0
HOW OFTEN DO YOU HAVE A DRINK CONTAINING ALCOHOL: NEVER
SKIP TO QUESTIONS 9-10: 1
AUDIT-C TOTAL SCORE: 0
HOW OFTEN DO YOU HAVE 6 OR MORE DRINKS ON ONE OCCASION: NEVER

## 2025-04-26 ASSESSMENT — PATIENT HEALTH QUESTIONNAIRE - PHQ9
1. LITTLE INTEREST OR PLEASURE IN DOING THINGS: NOT AT ALL
2. FEELING DOWN, DEPRESSED OR HOPELESS: NOT AT ALL
SUM OF ALL RESPONSES TO PHQ9 QUESTIONS 1 & 2: 0

## 2025-04-26 ASSESSMENT — PAIN DESCRIPTION - DESCRIPTORS
DESCRIPTORS: HEADACHE
DESCRIPTORS: ACHING
DESCRIPTORS: ACHING
DESCRIPTORS: HEADACHE
DESCRIPTORS: HEADACHE
DESCRIPTORS: ACHING

## 2025-04-26 ASSESSMENT — ACTIVITIES OF DAILY LIVING (ADL)
LACK_OF_TRANSPORTATION: NO
LACK_OF_TRANSPORTATION: NO

## 2025-04-26 ASSESSMENT — PAIN - FUNCTIONAL ASSESSMENT: PAIN_FUNCTIONAL_ASSESSMENT: 0-10

## 2025-04-26 NOTE — PROGRESS NOTES
Vancomycin Dosing by Pharmacy- Cessation of Therapy    Consult to pharmacy for vancomycin dosing has been discontinued by the prescriber, pharmacy will sign off at this time.    Please call pharmacy if there are further questions or re-enter a consult if vancomycin is resumed.     Liss Hernandez, PharmD

## 2025-04-26 NOTE — CONSULTS
Vancomycin Dosing by Pharmacy- INITIAL    Juliet Kwok is a 19 y.o. year old female who Pharmacy has been consulted for vancomycin dosing for abdominal infection. Based on the patient's indication and renal status this patient will be dosed based on a goal AUC of 400-600.     Renal function is currently stable.    Visit Vitals  BP (!) 141/69   Pulse (!) 118   Temp 37.1 °C (98.8 °F)   Resp 20        Lab Results   Component Value Date    CREATININE 0.40 (L) 2025        Patient weight is as follows:   Vitals:    25 1515   Weight: 97 kg (213 lb 13.5 oz)       Cultures:  No results found for the encounter in last 14 days.        No intake/output data recorded.  I/O during current shift:  No intake/output data recorded.    Temp (24hrs), Av.7 °C (99.9 °F), Min:37.1 °C (98.8 °F), Max:38.8 °C (101.8 °F)         Assessment/Plan     Patient will not be given a loading dose.  Will initiate vancomycin maintenance,  1500 mg every 12 hours.    This dosing regimen is predicted by [employer] to result in the following pharmacokinetic parameters:  Loading dose: N/A  Regimen: 1500 mg IV every 12 hours.  Start time: 15:24 on 2025  Exposure target: AUC24 (range)400-600 mg/L.hr   IWY21-52: 463 mg/L.hr  AUC24,ss: 548 mg/L.hr  Probability of AUC24 > 400: 81 %  Ctrough,ss: 16.6 mg/L  Probability of Ctrough,ss > 20: 35 %    Follow-up level will be ordered on  at 0500 unless clinically indicated sooner.  Will continue to monitor renal function daily while on vancomycin and order serum creatinine at least every 48 hours if not already ordered.  Follow for continued vancomycin needs, clinical response, and signs/symptoms of toxicity.       Liss Hernandez, PharmD

## 2025-04-26 NOTE — H&P
" OB Postpartum Triage H&P    Assessment/Plan   Juliet Kwok is a 19 y.o. year old , who delivered at 39w1d gestation via Vaginal, Spontaneous, now PPD#11 presenting to triage for fever/chills, body aches. Admitted for c/f sepsis.    Plan:     C/f Sepsis, C/f Endometritis  -Sepsis screen positive based on Temperature 38.8 and HR > 120 for 10 min  -Sepsis protocol initiated at 1442  -Antibiotics: Zosyn and vancomycin  -Fluid bolus: 1 L LR  -Labs: Blood cultures x2, urine culture, lactate, CBC with diff, CMP, coags, fibrinogen, troponin, BNP, COVID/flu pending  -Scheduled ibuprofen and tylenol Q6H  -Urine dip n/f 1+ leukocytes, negative nitrites  -Based on physical exam and patient history, likely endometritis, will narrow antibiotic regimen based on labs and patient response  -Other possible etiologies for symptoms: DVT/PE given LE pain and chest pain with tachycardia, appendicitis, pyelonephritis    gHTN  - Dx by mild range BPs > 4 hrs apart  - Mild range on 4/15, in recovery  - BP mild range on admission  - HELLP labs pending  - Continue to monitor for need for antihypertensive agent    Postpartum  -Postpartum lochia WNL  -Breastfeeding status: No  -PPBC: None  -Rh negative, s/p PP rhogam, FMS negative  -Postpartum follow-up scheduled: Yes,     Dispo:  Admit to L&D for monitoring and sepsis protocol    Discussed plan of care with Dr. Ennis. Handoff to Dr. Bahena, for further management by [facility] team.  Kaylee Umana, APRN-CNP     Subjective     Patient reports suddenly having chills and body aches starting at noon today. Her legs are very painful bilaterally. Denies pain prior to noon today. Denies sick contacts, N/V/D. Denies upper respiratory symptoms    -Her pain is poorly controlled with current medications- whole body pain, with worst pain in bilateral legs, rated \"20/10.\"  -She is not having regular bowel movements- she feels constipated, last BM yesterday afternoon and was soft.  -She " reports postpartum lochia: minimal   -She reports no breast or nursing problems- formula feeding, denies breast pain other than the general pain she has everywhere  -She denies emotional concerns today   -Her plan for contraception is none    Denies shortness of breath, but endorses chest pain along with whole body pain.    Prenatal Provider [place]    Deliver Method: Vaginal, Spontaneous    Pregnancy Problems (from 09/05/24 to present)       Problem Noted Diagnosed Resolved    Encounter for induction of labor 4/14/2025 by Flor Felton MD  No    Priority:  Medium       Iron deficiency anemia during pregnancy 3/4/2025 by Flor Dickson MD  No    Priority:  Medium       Overview Addendum 3/19/2025  7:54 AM by Kristi Nicole MD   - noted on CBC 3/3/25, rx sent 3/4/25  - repeat hgb 8.9 (from 9.1) without increased reticulocytes. For IV iron         Asthma affecting pregnancy, antepartum ([facility]) 1/6/2025 by Madai Ko MD  No    Priority:  Medium       Rh negative state in antepartum period ([facility]) 12/12/2024 by Madai Ko MD  No    Priority:  Medium       Overview Signed 2/3/2025 12:46 PM by Flor Dickson MD   - Rhogam given 2/3/25         38 weeks gestation of pregnancy ([facility]) 10/7/2024 by Natasha De Leon MD  No    Priority:  Medium       Overview Addendum 3/31/2025 11:49 AM by Madai Ko MD   Desired provider in labor: [x] CNM  [] Physician  [x] Blood Products: [x] Yes, accepts [] No, needs counseling  [x] Initial BMI: Could not be calculated   [x] Prenatal Labs: 10/7  [x] Cervical Cancer Screening: N/A, <20 y/o  [x] Rh status: NEG, needs Rhogam at 28wks  [x] Genetic Screening:  rr cfDNA  [x] NT US: (11-13 wks) nml  [x] Baby ASA (if indicated):  [x] Pregnancy dated by: LMP c/w 6wk US at OSH     [x] Anatomy US: (19-20 wks): 11/25- wnl   [x] Federal Sterilization consent signed (if indicated): n/a  [x] 1hr GCT at 24-28wks: drawn 2/3/25  [x] Rhogam (if indicated): 2/3/25  []  Fetal Surveillance (if indicated):  [x] Tdap (27-32 wks, may be given up to 36 wks if initial window missed): 2/3/25  [x] RSV (32-36 wks) (Sept. to end of ): not offered  [x] Flu Vaccine: Declined ,    [x] Breastfeeding: Undecided, leaning toward bottle-feeding. Breast pump ordered  [x] Postpartum Birth control method: leaning towards Nexplanon   [x] GBS at 36 - 37 wks: neg3/24  [] 39 weeks discussion of IOL vs. Expectant management: IOL  [] Mode of delivery (anticipated):  anticipated          Vaginal bleeding in pregnancy, first trimester ([facility]) 10/7/2024 by Natasha De Leon MD  No    Priority:  Medium       Bacterial vaginosis in pregnancy ([facility]) 2024 by Madai Ko MD  2/3/2025 by Flor Dickson MD    Back pain in pregnancy ([facility]) 2024 by Madai Ko MD  2024 by Madai Ko MD    Chlamydia infection affecting pregnancy in first trimester ([facility]) 2024 by NATACHA Ngo-SARAH  2/3/2025 by Flor Dickson MD    Overview Addendum 2/3/2025 12:42 PM by Flor Dickson MD   +chlamydia , treated with azithromycin 8/15/2024  - MARTIN negative in October         Pregnancy of unknown anatomic location ([facility]) 2024 by NATACHA Ngo-SARAH  10/7/2024 by Natasha De Leon MD            OB History    Para Term  AB Living   1 1 1 0 0 1   SAB IAB Ectopic Multiple Live Births   0 0 0 0 1      # Outcome Date GA Lbr Yoseph/2nd Weight Sex Type Anes PTL Lv   1 Term 04/15/25 39w1d 23:58 / 00:27 3.48 kg M Vag-Spont EPI  JAYASHREE      Name: Ronaldo Kwok      Apgar1: 8  Apgar5: 9       Surgical History[1]    Social History     Tobacco Use    Smoking status: Never    Smokeless tobacco: Never   Substance Use Topics    Alcohol use: Never       Allergies[2]    Objective     Last Vitals  Temp Pulse Resp BP MAP O2 Sat   (!) 38.8 °C (101.8 °F) (!) 134 20 139/59   97 %     Vitals Min/Max Last 24 Hours:  Temp  Min: 38.8 °C (101.8 °F)  Max:  38.8 °C (101.8 °F)  Pulse  Min: 124  Max: 149  Resp  Min: 20  Max: 20  BP  Min: 139/59  Max: 139/59    Intake/Output:   No intake or output data in the 24 hours ending 04/26/25 1444    Physical exam:  General: Examination reveals a well developed, well nourished, female, in acute distress. Patient is shaking, crying out in pain. She is alert and cooperative.  Lungs: symmetrical, non-labored breathing. LS CTA BL  Cardiac: warm, well-perfused. S1&S2 no murmur  Abdomen: soft, non-tender.  Fundus: firm, below umbilicus, tender.  Extremities: no redness in the calves or thighs. Tender to palpation throughout  Neurological: alert, oriented, normal speech, no focal findings or movement disorder noted.           Admission on 04/26/2025   Component Date Value Ref Range Status    WBC 04/26/2025 6.3  4.4 - 11.3 x10*3/uL Preliminary    nRBC 04/26/2025 0.0  0.0 - 0.0 /100 WBCs Preliminary    RBC 04/26/2025 4.85  4.00 - 5.20 x10*6/uL Preliminary    Hemoglobin 04/26/2025 11.5 (L)  12.0 - 16.0 g/dL Preliminary    Hematocrit 04/26/2025 35.9 (L)  36.0 - 46.0 % Preliminary    MCV 04/26/2025 74 (L)  80 - 100 fL Preliminary    MCH 04/26/2025 23.7 (L)  26.0 - 34.0 pg Preliminary    MCHC 04/26/2025 32.0  32.0 - 36.0 g/dL Preliminary    RDW 04/26/2025 16.6 (H)  11.5 - 14.5 % Preliminary    Platelets 04/26/2025 442  150 - 450 x10*3/uL Preliminary    Fibrinogen 04/26/2025 331  200 - 400 mg/dL Final    Glucose 04/26/2025 82  74 - 99 mg/dL Final    Sodium 04/26/2025 138  136 - 145 mmol/L Final    Potassium 04/26/2025 4.2  3.5 - 5.3 mmol/L Final    Chloride 04/26/2025 103  98 - 107 mmol/L Final    Bicarbonate 04/26/2025 20 (L)  21 - 32 mmol/L Final    Anion Gap 04/26/2025 19  10 - 20 mmol/L Final    Urea Nitrogen 04/26/2025 9  6 - 23 mg/dL Final    Creatinine 04/26/2025 0.77  0.50 - 1.05 mg/dL Final    eGFR 04/26/2025 >90  >60 mL/min/1.73m*2 Final    Calculations of estimated GFR are performed using the 2021 CKD-EPI Study Refit equation without  the race variable for the IDMS-Traceable creatinine methods.  https://jasn.asnjournals.org/content/early/2021/09/22/ASN.4682998442    Calcium 04/26/2025 9.1  8.6 - 10.6 mg/dL Final    Albumin 04/26/2025 4.2  3.4 - 5.0 g/dL Final    Alkaline Phosphatase 04/26/2025 139 (H)  33 - 110 U/L Final    Total Protein 04/26/2025 7.9  6.4 - 8.2 g/dL Final    AST 04/26/2025 15  9 - 39 U/L Final    Bilirubin, Total 04/26/2025 0.5  0.0 - 1.2 mg/dL Final    ALT 04/26/2025 17  7 - 45 U/L Final    Patients treated with Sulfasalazine may generate falsely decreased results for ALT.    Protime 04/26/2025 12.7 (H)  9.8 - 12.4 seconds Final    INR 04/26/2025 1.1  0.9 - 1.1 Final    aPTT 04/26/2025 26  26 - 36 seconds Final    BNP 04/26/2025 45  0 - 99 pg/mL Final    Troponin I, High Sensitivity ([facility]) 04/26/2025 7  0 - 34 ng/L Final    Lactate 04/26/2025 1.8  0.4 - 2.0 mmol/L Final                    [1] No past surgical history on file.  [2] No Known Allergies

## 2025-04-26 NOTE — SIGNIFICANT EVENT
"Transfer to Postpartum    Subjective  Juliet Kwok is a 19 y.o. year old , who delivered at 39w1d gestation via Vaginal, Spontaneous, now PPD#11 presenting to triage for fever/chills, body aches. Admitted for postpartum endometritis with positive sepsis screen.       Objective  Visit Vitals  /56   Pulse 105   Temp 37.5 °C (99.5 °F) (Temporal)   Resp 20   Ht 1.803 m (5' 11\")   Wt 97 kg (213 lb 13.5 oz)   LMP 07/15/2024 (Exact Date)   SpO2 (!) 95%   BMI 29.83 kg/m²   OB Status Recent pregnancy   Smoking Status Never   BSA 2.2 m²       Physical exam:  General: Examination reveals a well developed, well nourished, female, in acute distress. Patient is shaking, crying out in pain. She is alert and cooperative.  Lungs: symmetrical, non-labored breathing. LS CTA BL  Cardiac: warm, well-perfused. S1&S2 no murmur  Abdomen: soft, non-tender.  Fundus: firm, below umbilicus, tender.  Extremities: no redness in the calves or thighs. Tender to palpation throughout    Assessment/Plan     Sepsis Criteria, Postpartum Endometritis   -Sepsis screen positive based on Temperature 38.8 (1400) and HR > 120 for 10 min, now with normal HR  -Sepsis protocol initiated at [address]  -Antibiotics: Zosyn and vancomycin  --> transitioned to gent clinda due to suspected endometritis rource  -Labs:   #Blood cultures NGTD x2, urine culture pending  # lactate, CBC with diff, CMP, coags, fibrinogen, troponin, BNP, COVID/flu neg  -Scheduled ibuprofen and tylenol Q6H  - will continue to monitor, now no longer tachy, afebrile since 1400, and pain improving, will continue abx for 24h and monitor symptoms, okay for transfer to postpartum    gHTN  - Dx by mild range BPs > 4 hrs apart  - BP mild range to normotensive  - HELLP labs neg  - Continue to monitor for need for antihypertensive agent     Postpartum  -Postpartum lochia WNL  -Breastfeeding status: No  -PPBC: None  -Rh negative, s/p PP rhogam, FMS negative  -Postpartum follow-up " scheduled: Yes, 5/20    Ruslan Engel, PGY4 OBGYN

## 2025-04-27 VITALS
HEIGHT: 71 IN | TEMPERATURE: 98.4 F | OXYGEN SATURATION: 95 % | BODY MASS INDEX: 29.94 KG/M2 | SYSTOLIC BLOOD PRESSURE: 135 MMHG | HEART RATE: 63 BPM | RESPIRATION RATE: 16 BRPM | WEIGHT: 213.85 LBS | DIASTOLIC BLOOD PRESSURE: 84 MMHG

## 2025-04-27 LAB
ANION GAP SERPL CALC-SCNC: 15 MMOL/L (ref 10–20)
BACTERIA BLD CULT: NORMAL
BASOPHILS # BLD AUTO: 0.04 X10*3/UL (ref 0–0.1)
BASOPHILS NFR BLD AUTO: 0.6 %
BUN SERPL-MCNC: 9 MG/DL (ref 6–23)
CALCIUM SERPL-MCNC: 8.4 MG/DL (ref 8.6–10.6)
CHLORIDE SERPL-SCNC: 107 MMOL/L (ref 98–107)
CO2 SERPL-SCNC: 22 MMOL/L (ref 21–32)
CREAT SERPL-MCNC: 0.61 MG/DL (ref 0.5–1.05)
EGFRCR SERPLBLD CKD-EPI 2021: >90 ML/MIN/1.73M*2
EOSINOPHIL # BLD AUTO: 0.26 X10*3/UL (ref 0–0.7)
EOSINOPHIL NFR BLD AUTO: 3.7 %
ERYTHROCYTE [DISTWIDTH] IN BLOOD BY AUTOMATED COUNT: 16.9 % (ref 11.5–14.5)
GLUCOSE SERPL-MCNC: 79 MG/DL (ref 74–99)
HCT VFR BLD AUTO: 32.9 % (ref 36–46)
HGB BLD-MCNC: 10.4 G/DL (ref 12–16)
IMM GRANULOCYTES # BLD AUTO: 0.05 X10*3/UL (ref 0–0.7)
IMM GRANULOCYTES NFR BLD AUTO: 0.7 % (ref 0–0.9)
LYMPHOCYTES # BLD AUTO: 1.87 X10*3/UL (ref 1.2–4.8)
LYMPHOCYTES NFR BLD AUTO: 26.8 %
MCH RBC QN AUTO: 24.5 PG (ref 26–34)
MCHC RBC AUTO-ENTMCNC: 31.6 G/DL (ref 32–36)
MCV RBC AUTO: 77 FL (ref 80–100)
MONOCYTES # BLD AUTO: 0.42 X10*3/UL (ref 0.1–1)
MONOCYTES NFR BLD AUTO: 6 %
NEUTROPHILS # BLD AUTO: 4.33 X10*3/UL (ref 1.2–7.7)
NEUTROPHILS NFR BLD AUTO: 62.2 %
NRBC BLD-RTO: 0 /100 WBCS (ref 0–0)
PLATELET # BLD AUTO: 413 X10*3/UL (ref 150–450)
POTASSIUM SERPL-SCNC: 3.7 MMOL/L (ref 3.5–5.3)
RBC # BLD AUTO: 4.25 X10*6/UL (ref 4–5.2)
SODIUM SERPL-SCNC: 140 MMOL/L (ref 136–145)
WBC # BLD AUTO: 7 X10*3/UL (ref 4.4–11.3)

## 2025-04-27 PROCEDURE — 1210000001 HC SEMI-PRIVATE ROOM DAILY

## 2025-04-27 PROCEDURE — 2500000004 HC RX 250 GENERAL PHARMACY W/ HCPCS (ALT 636 FOR OP/ED): Mod: SE | Performed by: STUDENT IN AN ORGANIZED HEALTH CARE EDUCATION/TRAINING PROGRAM

## 2025-04-27 PROCEDURE — G0378 HOSPITAL OBSERVATION PER HR: HCPCS

## 2025-04-27 PROCEDURE — 2500000001 HC RX 250 WO HCPCS SELF ADMINISTERED DRUGS (ALT 637 FOR MEDICARE OP): Mod: SE | Performed by: STUDENT IN AN ORGANIZED HEALTH CARE EDUCATION/TRAINING PROGRAM

## 2025-04-27 PROCEDURE — 36415 COLL VENOUS BLD VENIPUNCTURE: CPT | Performed by: STUDENT IN AN ORGANIZED HEALTH CARE EDUCATION/TRAINING PROGRAM

## 2025-04-27 PROCEDURE — 80048 BASIC METABOLIC PNL TOTAL CA: CPT | Performed by: STUDENT IN AN ORGANIZED HEALTH CARE EDUCATION/TRAINING PROGRAM

## 2025-04-27 PROCEDURE — 99233 SBSQ HOSP IP/OBS HIGH 50: CPT | Performed by: OBSTETRICS & GYNECOLOGY

## 2025-04-27 PROCEDURE — 85025 COMPLETE CBC W/AUTO DIFF WBC: CPT | Performed by: STUDENT IN AN ORGANIZED HEALTH CARE EDUCATION/TRAINING PROGRAM

## 2025-04-27 RX ADMIN — IBUPROFEN 600 MG: 600 TABLET ORAL at 14:51

## 2025-04-27 RX ADMIN — POLYETHYLENE GLYCOL 3350 17 G: 17 POWDER, FOR SOLUTION ORAL at 09:11

## 2025-04-27 RX ADMIN — CLINDAMYCIN PHOSPHATE 900 MG: 900 INJECTION, SOLUTION INTRAVENOUS at 09:10

## 2025-04-27 RX ADMIN — FLUTICASONE FUROATE AND VILANTEROL TRIFENATATE 1 PUFF: 100; 25 POWDER RESPIRATORY (INHALATION) at 09:10

## 2025-04-27 RX ADMIN — FAMOTIDINE 20 MG: 20 TABLET, FILM COATED ORAL at 09:10

## 2025-04-27 RX ADMIN — FAMOTIDINE 20 MG: 20 TABLET, FILM COATED ORAL at 21:04

## 2025-04-27 RX ADMIN — CLINDAMYCIN PHOSPHATE 900 MG: 900 INJECTION, SOLUTION INTRAVENOUS at 16:58

## 2025-04-27 RX ADMIN — IBUPROFEN 600 MG: 600 TABLET ORAL at 21:04

## 2025-04-27 RX ADMIN — ACETAMINOPHEN 975 MG: 325 TABLET, FILM COATED ORAL at 21:03

## 2025-04-27 RX ADMIN — GENTAMICIN SULFATE 410 MG: 40 INJECTION, SOLUTION INTRAMUSCULAR; INTRAVENOUS at 17:21

## 2025-04-27 RX ADMIN — ACETAMINOPHEN 975 MG: 325 TABLET, FILM COATED ORAL at 09:10

## 2025-04-27 RX ADMIN — CLINDAMYCIN PHOSPHATE 900 MG: 900 INJECTION, SOLUTION INTRAVENOUS at 01:33

## 2025-04-27 RX ADMIN — IBUPROFEN 600 MG: 600 TABLET ORAL at 09:10

## 2025-04-27 RX ADMIN — IBUPROFEN 600 MG: 600 TABLET ORAL at 03:09

## 2025-04-27 RX ADMIN — ACETAMINOPHEN 975 MG: 325 TABLET, FILM COATED ORAL at 14:51

## 2025-04-27 RX ADMIN — ACETAMINOPHEN 975 MG: 325 TABLET, FILM COATED ORAL at 03:09

## 2025-04-27 ASSESSMENT — PAIN SCALES - GENERAL
PAINLEVEL_OUTOF10: 0 - NO PAIN
PAINLEVEL_OUTOF10: 3
PAINLEVEL_OUTOF10: 5 - MODERATE PAIN
PAINLEVEL_OUTOF10: 4

## 2025-04-27 ASSESSMENT — PAIN - FUNCTIONAL ASSESSMENT: PAIN_FUNCTIONAL_ASSESSMENT: 0-10

## 2025-04-27 ASSESSMENT — PAIN DESCRIPTION - DESCRIPTORS
DESCRIPTORS: ACHING
DESCRIPTORS: SORE

## 2025-04-27 NOTE — PROGRESS NOTES
Postpartum Progress Note    Assessment/Plan   Juliet Kwok is a 19 y.o., , who delivered at 39w1d gestation and is now postpartum day 12 admitted for postpartum endometritis    Endometritis  -Sepsis screen positive on admission: T 38.8 and HR > 120 for 10 min  -Initial sepsis protocol initiated: 1 L LR bolus, 1x dose of IV Vanc/Zosyn  -Labs:          Blood cultures x2 NGTD           Urine culture pending          Lactate wnl, WBC 6., CMP, coags, fibrinogen, troponin, BNP wnl          COVID/flu neg   -Pt with fundal tenderness on admission. After initial protocol, vital signs markedly improved. Abx regimen switched to IV Gent/Clinda given no ongoing WBC. Exam markedly improved this AM since receiving abx.  -Scheduled ibuprofen and tylenol Q6H  -Urine dip n/f 1+ leukocytes, negative nitrites  -Continue IV abx for additional 24 hours.     gHTN  - Dx by mild range BPs > 4 hrs apart  - Mild range on 4/15, in recovery  - BP mild range to normotensive  on admission  - HELLP labs negative  -Asymptomatic  -Plan to rx BP cuff for homegoing and schedule BP FUV within 1 week of discharge.     Postpartum  -Postpartum lochia WNL  -Breastfeeding status: No  -PPBC: None  -Rh negative, s/p PP rhogam, FMS negative  -Postpartum follow-up scheduled: Yes,     Plan of care d/w Dr. Ismael Bahena MD PGY-4    Assessment & Plan  Postpartum state ([facility])    Gestational hypertension ([facility])    Pregnancy Problems (from 24 to present)       Problem Noted Diagnosed Resolved    Gestational hypertension ([facility]) 2025 by Kaylee Umana APRN-CNP  No    Priority:  Medium       Encounter for induction of labor 2025 by Flor Felton MD  No    Priority:  Medium       Iron deficiency anemia during pregnancy 3/4/2025 by Flor Dickson MD  No    Priority:  Medium       Overview Addendum 3/19/2025  7:54 AM by Kristi Nicole MD   - noted on CBC 3/3/25, rx sent 3/4/25  - repeat hgb 8.9  (from 9.1) without increased reticulocytes. For IV iron         Asthma affecting pregnancy, antepartum ([facility]) 2025 by Madai Ko MD  No    Priority:  Medium       Rh negative state in antepartum period ([facility]) 2024 by Madai Ko MD  No    Priority:  Medium       Overview Signed 2/3/2025 12:46 PM by Flor Dickson MD   - Rhogam given 2/3/25         38 weeks gestation of pregnancy ([facility]) 10/7/2024 by Natasha De Leon MD  No    Priority:  Medium       Overview Addendum 3/31/2025 11:49 AM by Madai Ko MD   Desired provider in labor: [x] CNM  [] Physician  [x] Blood Products: [x] Yes, accepts [] No, needs counseling  [x] Initial BMI: Could not be calculated   [x] Prenatal Labs: 10/7  [x] Cervical Cancer Screening: N/A, <20 y/o  [x] Rh status: NEG, needs Rhogam at 28wks  [x] Genetic Screening:  rr cfDNA  [x] NT US: (11-13 wks) nml  [x] Baby ASA (if indicated):  [x] Pregnancy dated by: LMP c/w 6wk US at OSH     [x] Anatomy US: (19-20 wks): - wnl   [x] Federal Sterilization consent signed (if indicated): n/a  [x] 1hr GCT at 24-28wks: drawn 2/3/25  [x] Rhogam (if indicated): 2/3/25  [] Fetal Surveillance (if indicated):  [x] Tdap (27-32 wks, may be given up to 36 wks if initial window missed): 2/3/25  [x] RSV (32-36 wks) (Sept. to end of ): not offered  [x] Flu Vaccine: Declined ,    [x] Breastfeeding: Undecided, leaning toward bottle-feeding. Breast pump ordered  [x] Postpartum Birth control method: leaning towards Nexplanon   [x] GBS at 36 - 37 wks: neg3/24  [] 39 weeks discussion of IOL vs. Expectant management: IOL  [] Mode of delivery (anticipated):  anticipated          Vaginal bleeding in pregnancy, first trimester ([facility]) 10/7/2024 by Natasha De Leon MD  No    Priority:  Medium       Bacterial vaginosis in pregnancy ([facility]) 2024 by Madai Ko MD  2/3/2025 by Flor Dickson MD    Priority:  Medium       Back pain in pregnancy  ([facility]) 11/4/2024 by Madai Ko MD  12/12/2024 by Madai Ko MD    Priority:  Medium       Chlamydia infection affecting pregnancy in first trimester ([facility]) 8/19/2024 by Marli Kidd, APRN-[facility]  2/3/2025 by Flor Dickson MD    Priority:  Medium       Overview Addendum 2/3/2025 12:42 PM by Flor Dickson MD   +chlamydia 8/14, treated with azithromycin 8/15/2024  - MARTIN negative in October         Pregnancy of unknown anatomic location ([facility]) 8/19/2024 by Marli Kidd, APRN-CN  10/7/2024 by Natasha De Leon MD    Priority:  Medium             Hospital course: endometritis       Subjective   Her pain is well controlled with current medications  She is passing flatus  She is ambulating well  She is tolerating a Adult diet Regular  She reports no breast or nursing problems  She denies emotional concerns today   Her plan for contraception is none     Pt doing well this AM. Abdominal tenderness improved since being on abx. Denies muscle aches/spasms, nausea, vomiting. Tolerating PO well. No fevers, chills or acute events overnight. Denies HA, RUQ pain or blurry vision.     Objective   Allergies:   Patient has no known allergies.         Last Vitals:  Temp Pulse Resp BP MAP Pulse Ox   37 °C (98.6 °F) 70 18 113/71 85 95 %     Vitals Min/Max Last 24 Hours:  Temp  Min: 37 °C (98.6 °F)  Max: 38.8 °C (101.8 °F)  Pulse  Min: 70  Max: 149  Resp  Min: 18  Max: 20  BP  Min: 111/57  Max: 144/66  MAP (mmHg)  Min: 76  Max: 102    Intake/Output:     Intake/Output Summary (Last 24 hours) at 4/27/2025 0729  Last data filed at 4/26/2025 2020  Gross per 24 hour   Intake --   Output 1950 ml   Net -1950 ml     Physical Examination  General: no acute distress  HEENT: normocephalic, atraumatic  Heart: warm and well perfused  Lungs: breathing comfortably on room air  Abdomen: soft, nontender  Extremities: moving all extremities  Neuro: awake and conversant  Psych: appropriate mood and affect     Lab  Data:  Lab Results   Component Value Date    ABO B 04/26/2025    LABRH NEG 04/26/2025    ABSCRN POS 04/26/2025     Lab Results   Component Value Date    WBC 7.0 04/27/2025    HGB 10.4 (L) 04/27/2025    HCT 32.9 (L) 04/27/2025     04/27/2025     0   Lab Value Date/Time    GRPBSTREP SEE NOTE 03/24/2025 1241     Lab Results   Component Value Date    GLUCOSE 82 04/26/2025     04/26/2025    K 4.2 04/26/2025     04/26/2025    CO2 20 (L) 04/26/2025    ANIONGAP 19 04/26/2025    BUN 9 04/26/2025    CREATININE 0.77 04/26/2025    EGFR >90 04/26/2025    CALCIUM 9.1 04/26/2025    ALBUMIN 4.2 04/26/2025    PROT 7.9 04/26/2025    ALKPHOS 139 (H) 04/26/2025    ALT 17 04/26/2025    AST 15 04/26/2025    BILITOT 0.5 04/26/2025     Lab Results   Component Value Date    APTT 26 04/26/2025    PROTIME 12.7 (H) 04/26/2025    INR 1.1 04/26/2025     Lab Results   Component Value Date    FIBRINOGEN 331 04/26/2025     Lab Results   Component Value Date    BNP 45 04/26/2025

## 2025-04-27 NOTE — DISCHARGE INSTRUCTIONS

## 2025-04-27 NOTE — CARE PLAN
Problem: Postpartum  Goal: Minimal s/sx of HDP and BP<160/110  Outcome: Progressing     Problem: Pain - Adult  Goal: Verbalizes/displays adequate comfort level or baseline comfort level  Outcome: Progressing   The patient's goals for the shift include get some rest    The clinical goals for the shift include VSS

## 2025-04-27 NOTE — CARE PLAN
The patient's goals for the shift include maintain normal postpartum course    The clinical goals for the shift include maintain normal postpartum course      Problem: Postpartum  Goal: Minimal s/sx of HDP and BP<160/110  Outcome: Progressing     Problem: Pain - Adult  Goal: Verbalizes/displays adequate comfort level or baseline comfort level  Outcome: Progressing

## 2025-04-28 ENCOUNTER — TELEPHONE (OUTPATIENT)
Dept: OBSTETRICS AND GYNECOLOGY | Facility: CLINIC | Age: 20
End: 2025-04-28
Payer: COMMERCIAL

## 2025-04-28 VITALS
TEMPERATURE: 98.2 F | DIASTOLIC BLOOD PRESSURE: 76 MMHG | OXYGEN SATURATION: 95 % | HEART RATE: 72 BPM | BODY MASS INDEX: 29.94 KG/M2 | HEIGHT: 71 IN | SYSTOLIC BLOOD PRESSURE: 124 MMHG | RESPIRATION RATE: 14 BRPM | WEIGHT: 213.85 LBS

## 2025-04-28 LAB
BACTERIA UR CULT: NORMAL
BB ANTIBODY IDENTIFICATION: NORMAL
CASE #: NORMAL

## 2025-04-28 PROCEDURE — 99238 HOSP IP/OBS DSCHRG MGMT 30/<: CPT

## 2025-04-28 PROCEDURE — G0378 HOSPITAL OBSERVATION PER HR: HCPCS

## 2025-04-28 PROCEDURE — 2500000004 HC RX 250 GENERAL PHARMACY W/ HCPCS (ALT 636 FOR OP/ED): Mod: SE | Performed by: STUDENT IN AN ORGANIZED HEALTH CARE EDUCATION/TRAINING PROGRAM

## 2025-04-28 PROCEDURE — 2500000001 HC RX 250 WO HCPCS SELF ADMINISTERED DRUGS (ALT 637 FOR MEDICARE OP): Mod: SE | Performed by: STUDENT IN AN ORGANIZED HEALTH CARE EDUCATION/TRAINING PROGRAM

## 2025-04-28 RX ADMIN — ACETAMINOPHEN 975 MG: 325 TABLET, FILM COATED ORAL at 08:08

## 2025-04-28 RX ADMIN — IBUPROFEN 600 MG: 600 TABLET ORAL at 03:21

## 2025-04-28 RX ADMIN — ACETAMINOPHEN 975 MG: 325 TABLET, FILM COATED ORAL at 03:21

## 2025-04-28 RX ADMIN — FAMOTIDINE 20 MG: 20 TABLET, FILM COATED ORAL at 08:08

## 2025-04-28 RX ADMIN — IBUPROFEN 600 MG: 600 TABLET ORAL at 08:08

## 2025-04-28 RX ADMIN — CLINDAMYCIN PHOSPHATE 900 MG: 900 INJECTION, SOLUTION INTRAVENOUS at 01:08

## 2025-04-28 ASSESSMENT — PAIN SCALES - GENERAL
PAINLEVEL_OUTOF10: 4
PAINLEVEL_OUTOF10: 3

## 2025-04-28 ASSESSMENT — PAIN DESCRIPTION - DESCRIPTORS: DESCRIPTORS: HEADACHE

## 2025-04-28 NOTE — CARE PLAN
Problem: Postpartum  Goal: Minimal s/sx of HDP and BP<160/110  Outcome: Progressing     Problem: Pain - Adult  Goal: Verbalizes/displays adequate comfort level or baseline comfort level  Outcome: Progressing   The patient's goals for the shift include rest    The clinical goals for the shift include VSS

## 2025-04-28 NOTE — TELEPHONE ENCOUNTER
Pt called and scheduled for visit next week    ----- Message from Mk Aj sent at 4/28/2025  2:14 PM EDT -----  Regarding: Fuv s/p admission for PP endometritis  Hello, Can we please have this scheduled for a follow up visit with resident clinic in 1 week after being admitted for PP endometritis? Thank you!Ashley

## 2025-04-28 NOTE — DISCHARGE SUMMARY
Discharge Summary    Admission Date: 2025  Discharge Date: 2025    Discharge Diagnosis  Postpartum endometritis     Hospital Course  On , the patient presented on PPD#11 s/p a  with chills and myalgias. Vitals on presentation notable for fever to 38.8 and tachycardia to the 140s. Due to meeting sepsis criteria, urine and blood cultures were collected and she was started on IV Vanc/Zosyn. Lactate found to ne wnl. COVID and Flu negative. Physical exam on presentation notable for fundal tenderness, therefore she was diagnosed with postpartum endometritis and the antibiotics were then narrowed to Gentamicin and Clindamycin. On admission, she was also diagnosed with gHTN; rare mild range blood pressures on day of admission but remained normotensive for the remainder of the admission therefore no antihypertensives were initiated. By HD#3, she continued to remain afebrile, all of her other vitals remained wnl and her fundal tenderness remained resolved, therefore discharge was deemed appropriate at that time. Postpartum visit scheduled for  in resident clinic at [facility].    Pertinent Physical Exam At Time of Discharge  Constitutional: no visible distress, alert and cooperative  Eyes: clear sclera  Head/Neck: normocephalic  Respiratory/Thorax: normal respiratory effort on RA  Cardiovascular: warm and well perfused   Gastrointestinal: abdomen soft, fundus firm below U without any tenderness to palpation, no rebound or guarding   Musculoskeletal: grossly normal ROM  Extremities: BLEs symmetrical   Neurological: no gross deficits appreciated   Psychological: Appropriate mood and behavior  Skin: warm, dry, no lesions     Last Vitals:  Temp Pulse Resp BP MAP Pulse Ox   36.9 °C (98.4 °F) 70 16 126/84 98 93 %     Discharge Meds     Your medication list        ASK your doctor about these medications        Instructions Last Dose Given Next Dose Due   acetaminophen 325 mg tablet  Commonly known as: Tylenol       Take 3 tablets (975 mg) by mouth every 6 hours.       famotidine 20 mg tablet  Commonly known as: Pepcid      Take 1 tablet (20 mg) by mouth 2 times a day. For heartburn       FeroSuL 325 mg (65 mg iron) tablet  Generic drug: ferrous sulfate      Take 1 tablet (325 mg) by mouth 2 times a day.       ibuprofen 600 mg tablet      Take 1 tablet (600 mg) by mouth every 6 hours.       polyethylene glycol 17 gram packet  Commonly known as: Glycolax, Miralax      Take 17 g by mouth once daily.       Symbicort 160-4.5 mcg/actuation inhaler  Generic drug: budesonide-formoterol      Inhale 2 puffs 2 times a day. Rinse mouth with water after use to reduce aftertaste and incidence of candidiasis. Do not swallow.                 Complications Requiring Follow-Up  N/a    Test Results Pending At Discharge  Pending Labs       Order Current Status    Path Review-Immunohematology In process    Blood Culture Preliminary result    Blood Culture Preliminary result    Blood Culture Preliminary result    Blood Culture Preliminary result            Outpatient Follow-Up  Future Appointments   Date Time Provider Department Center   5/20/2025  2:15 PM Tevin Hopson MD [id number]       D/w and to be seen by Dr. Victor M Aj MD, PGY-3  [facility]

## 2025-04-28 NOTE — TELEPHONE ENCOUNTER
Pt scheduled Wednesday- pt could not come until then but will monitor her BP at home  ----- Message from Francesco Bahena sent at 4/27/2025 10:51 AM EDT -----  Regarding: BP Check Scheduling  Hi Team!Ms Kwok was re-admitted for postpartum endometritis on 4/26 and met criteria for gHTN while admitted. Will need a BP follow up visit at midw early this week if possible. Will be going home with a BP cuff for ongoing monitoring, no medications. Thanks!Francesco Bahena MD PGY-4

## 2025-04-28 NOTE — SIGNIFICANT EVENT
Patient meets criteria for home monitoring of blood pressure post discharge.  Reason:  current gestational hypertension. Met with patient to assess for availability of home BP monitor.   Patient does not have access to BP monitor at home.  Pt agreed to order home BP monitor from [email or web address].   Large BP monitor delivered to room. Patient educated on importance of continuing to monitor BP at home, recording BP on home monitoring log and s/sx of when to call her provider.  Pt verbalized understanding the above information.

## 2025-04-29 LAB — PATH REV-IMMUNOHEMATOLOGY-PR30: NORMAL

## 2025-04-30 ENCOUNTER — CLINICAL SUPPORT (OUTPATIENT)
Dept: OBSTETRICS AND GYNECOLOGY | Facility: CLINIC | Age: 20
End: 2025-04-30
Payer: COMMERCIAL

## 2025-04-30 VITALS
BODY MASS INDEX: 28.93 KG/M2 | SYSTOLIC BLOOD PRESSURE: 131 MMHG | HEART RATE: 82 BPM | DIASTOLIC BLOOD PRESSURE: 82 MMHG | WEIGHT: 207.44 LBS

## 2025-04-30 LAB
BACTERIA BLD CULT: NORMAL

## 2025-04-30 ASSESSMENT — PAIN - FUNCTIONAL ASSESSMENT: PAIN_FUNCTIONAL_ASSESSMENT: 0-10

## 2025-04-30 ASSESSMENT — PAIN SCALES - GENERAL: PAINLEVEL_OUTOF10: 0 - NO PAIN

## 2025-04-30 NOTE — PROGRESS NOTES
Patient here for recent hospital admission BP check, s/p vaginal delivery on 4/15/2025.    Diagnosis of gestational hypertension and postpartum endometritis.    BP today 131/82 with no symptoms.  Follow up appointments scheduled for 5/6/2025 (ER FUV), and 5/20/2025 (PP[facility])

## 2025-05-06 ENCOUNTER — POSTPARTUM VISIT (OUTPATIENT)
Dept: OBSTETRICS AND GYNECOLOGY | Facility: CLINIC | Age: 20
End: 2025-05-06
Payer: COMMERCIAL

## 2025-05-06 VITALS
BODY MASS INDEX: 28.87 KG/M2 | SYSTOLIC BLOOD PRESSURE: 113 MMHG | HEART RATE: 84 BPM | DIASTOLIC BLOOD PRESSURE: 72 MMHG | WEIGHT: 207 LBS

## 2025-05-06 DIAGNOSIS — Z3A.38 38 WEEKS GESTATION OF PREGNANCY (HHS-HCC): Primary | ICD-10-CM

## 2025-05-06 DIAGNOSIS — J45.909 ASTHMA AFFECTING PREGNANCY, ANTEPARTUM (HHS-HCC): ICD-10-CM

## 2025-05-06 DIAGNOSIS — O99.519 ASTHMA AFFECTING PREGNANCY, ANTEPARTUM (HHS-HCC): ICD-10-CM

## 2025-05-06 PROCEDURE — 0503F POSTPARTUM CARE VISIT: CPT

## 2025-05-06 PROCEDURE — 99213 OFFICE O/P EST LOW 20 MIN: CPT | Mod: GC

## 2025-05-06 ASSESSMENT — ENCOUNTER SYMPTOMS
HEMATOLOGIC/LYMPHATIC NEGATIVE: 0
RESPIRATORY NEGATIVE: 0
NEUROLOGICAL NEGATIVE: 0
EYES NEGATIVE: 0
CONSTITUTIONAL NEGATIVE: 0
ALLERGIC/IMMUNOLOGIC NEGATIVE: 0
CARDIOVASCULAR NEGATIVE: 0
MUSCULOSKELETAL NEGATIVE: 0
ENDOCRINE NEGATIVE: 0
GASTROINTESTINAL NEGATIVE: 0
PSYCHIATRIC NEGATIVE: 0

## 2025-05-06 ASSESSMENT — EDINBURGH POSTNATAL DEPRESSION SCALE (EPDS)
I HAVE BLAMED MYSELF UNNECESSARILY WHEN THINGS WENT WRONG: NO, NEVER
I HAVE LOOKED FORWARD WITH ENJOYMENT TO THINGS: AS MUCH AS I EVER DID
THE THOUGHT OF HARMING MYSELF HAS OCCURRED TO ME: NEVER
I HAVE FELT SAD OR MISERABLE: NO, NOT AT ALL
I HAVE BEEN SO UNHAPPY THAT I HAVE HAD DIFFICULTY SLEEPING: NOT AT ALL
I HAVE BEEN SO UNHAPPY THAT I HAVE BEEN CRYING: NO, NEVER
I HAVE FELT SCARED OR PANICKY FOR NO GOOD REASON: NO, NOT AT ALL
TOTAL SCORE: 1
THINGS HAVE BEEN GETTING ON TOP OF ME: NO, I HAVE BEEN COPING AS WELL AS EVER
I HAVE BEEN ABLE TO LAUGH AND SEE THE FUNNY SIDE OF THINGS: AS MUCH AS I ALWAYS COULD
I HAVE BEEN ANXIOUS OR WORRIED FOR NO GOOD REASON: HARDLY EVER

## 2025-05-06 ASSESSMENT — PAIN SCALES - GENERAL: PAINLEVEL_OUTOF10: 0-NO PAIN

## 2025-05-06 NOTE — PROGRESS NOTES
Subjective   Patient ID: Juliet Kwok is a 19 y.o. female who presents for Postpartum Care (Pt here for ppv d.o.d 4-15-25/Lmp-no postpartum menses/Last pap-Age/).  HPI  Patient reports feeling well. Denies any fevers, chills, abdominal pain, abnormal discharge, or vaginal bleeding. Has no complaints today.    Review of Systems   All other systems reviewed and are negative.      Objective   Physical Exam  Constitutional:       Appearance: Normal appearance.   Pulmonary:      Effort: Pulmonary effort is normal.   Abdominal:      General: Abdomen is flat.      Palpations: Abdomen is soft.      Comments: Fundus below the level of the umbilicus, firm. No fundal tenderness.   Neurological:      Mental Status: She is alert.         Assessment/Plan   20yo  s/p  on 4/15 here for follow up after re-admission for postpartum endometritis.    Postpartum endometritis  - s/p Vanc/Zosyn then Gent/Clinda  - Symptoms have resolved, pt recovering well  - No fundal tenderness on exam today, vitals wnl    Follow up in 2 weeks for your postpartum visit.         Seen and discussed w/ Dr. Be Edmonds MD PGY-2  Obstetrics & Gynecology

## 2025-05-08 NOTE — PROGRESS NOTES
I saw and evaluated the patient. I personally obtained the key and critical portions of the history and physical exam or was physically present for key and critical portions performed by the resident/fellow. I reviewed the resident/fellow's documentation and discussed the patient with the resident/fellow. I agree with the resident/fellow's medical decision making as documented in the note.    Flor Lr MD

## 2025-05-20 ENCOUNTER — APPOINTMENT (OUTPATIENT)
Dept: OBSTETRICS AND GYNECOLOGY | Facility: CLINIC | Age: 20
End: 2025-05-20
Payer: COMMERCIAL

## 2025-05-20 VITALS
DIASTOLIC BLOOD PRESSURE: 71 MMHG | HEART RATE: 88 BPM | WEIGHT: 209.6 LBS | SYSTOLIC BLOOD PRESSURE: 107 MMHG | BODY MASS INDEX: 29.23 KG/M2

## 2025-05-20 DIAGNOSIS — J45.909 ASTHMA AFFECTING PREGNANCY, ANTEPARTUM (HHS-HCC): ICD-10-CM

## 2025-05-20 DIAGNOSIS — O99.519 ASTHMA AFFECTING PREGNANCY, ANTEPARTUM (HHS-HCC): ICD-10-CM

## 2025-05-20 PROCEDURE — 96127 BRIEF EMOTIONAL/BEHAV ASSMT: CPT | Performed by: OBSTETRICS & GYNECOLOGY

## 2025-05-20 PROCEDURE — RXMED WILLOW AMBULATORY MEDICATION CHARGE

## 2025-05-20 PROCEDURE — 99214 OFFICE O/P EST MOD 30 MIN: CPT | Mod: GC

## 2025-05-20 RX ORDER — NORELGESTROMIN AND ETHINYL ESTRADIOL 35; 150 UG/MG; UG/MG
1 PATCH TRANSDERMAL WEEKLY
Qty: 12 PATCH | Refills: 3 | Status: SHIPPED | OUTPATIENT
Start: 2025-05-20 | End: 2025-05-20

## 2025-05-20 RX ORDER — NORELGESTROMIN AND ETHINYL ESTRADIOL 35; 150 UG/MG; UG/MG
1 PATCH TRANSDERMAL WEEKLY
Qty: 12 PATCH | Refills: 3 | Status: SHIPPED | OUTPATIENT
Start: 2025-05-20 | End: 2026-05-20

## 2025-05-20 ASSESSMENT — ENCOUNTER SYMPTOMS
RESPIRATORY NEGATIVE: 0
HEMATOLOGIC/LYMPHATIC NEGATIVE: 0
CARDIOVASCULAR NEGATIVE: 0
NEUROLOGICAL NEGATIVE: 0
CONSTITUTIONAL NEGATIVE: 0
ENDOCRINE NEGATIVE: 0
PSYCHIATRIC NEGATIVE: 0
EYES NEGATIVE: 0
ALLERGIC/IMMUNOLOGIC NEGATIVE: 0
GASTROINTESTINAL NEGATIVE: 0
MUSCULOSKELETAL NEGATIVE: 0

## 2025-05-20 ASSESSMENT — PAIN SCALES - GENERAL: PAINLEVEL_OUTOF10: 3

## 2025-05-20 ASSESSMENT — EDINBURGH POSTNATAL DEPRESSION SCALE (EPDS)
THINGS HAVE BEEN GETTING ON TOP OF ME: NO, I HAVE BEEN COPING AS WELL AS EVER
I HAVE BEEN ABLE TO LAUGH AND SEE THE FUNNY SIDE OF THINGS: AS MUCH AS I ALWAYS COULD
I HAVE BEEN SO UNHAPPY THAT I HAVE HAD DIFFICULTY SLEEPING: NOT AT ALL
I HAVE LOOKED FORWARD WITH ENJOYMENT TO THINGS: AS MUCH AS I EVER DID
THE THOUGHT OF HARMING MYSELF HAS OCCURRED TO ME: NEVER
I HAVE BEEN ANXIOUS OR WORRIED FOR NO GOOD REASON: NO, NOT AT ALL
I HAVE BLAMED MYSELF UNNECESSARILY WHEN THINGS WENT WRONG: NO, NEVER
I HAVE BEEN SO UNHAPPY THAT I HAVE BEEN CRYING: NO, NEVER
I HAVE FELT SAD OR MISERABLE: NO, NOT AT ALL
TOTAL SCORE: 0
I HAVE FELT SCARED OR PANICKY FOR NO GOOD REASON: NO, NOT AT ALL

## 2025-05-20 NOTE — PROGRESS NOTES
Postpartum Visit  25    Subjective   19 y.o.  presenting for postpartum follow-up.     Delivery Date: 4/15/25  GA at Delivery: 39w1d  Type of Delivery:     Pregnancy notable for:  -PP endometritis diagnosed PPD11 s/p IV Vanc/zosyn then gent/clinda  -gHTN, no meds  -anemia, last Hgb 9.3, on PO iron    Concerns:   -wants to discuss BCM  -reports episodes of abdominal pain since endometritis diagnosis although improving, denies any fevers or chills    Pain: controlled  Lacerations: 2nd degree  Menses: not yet  Sexual Intimacy: not yet  Contraceptive Method: none  Feeding Method: bottle  Lactation Consult Needed?: n/a    Birth Trauma: denies  Bonding with Baby: well   Mood:   Postpartum Depression: Low Risk  (2025)    [place]  Depression Scale     Last EPDS Total Score: 0     Last EPDS Self Harm Result: Never       Objective   Vitals:    25 1445   BP: 107/71   Pulse: 88       PHYSICAL EXAM  Physical Exam:  General: Well appearing, alert  HEENT: normocephalic, EOMI, clear sclera  Cardio: Warm and well perfused  Resp: breathing comfortably on room air  Abd: soft, mildly tender in bilateral lower quadrants, nondistended  Neuro: grossly intact, no focal deficits  Extremities: full ROM  Psych: A&O x3, appropriate mood and affect    Assessment   IMPRESSIONS:  Juliet Kwok is a 19 y.o. now  who is s/p  on 4/15 and who presents for postpartum visit.    Problem List Items Addressed This Visit       Postpartum state ([facility]) - Primary    Relevant Medications    norelgestromin-ethin.estradioL ([facility]) 150-35 mcg/24 hr     - discussed birth control methods, pt considering Nexplanon or IUD, will reschedule visit if she decides   - she desires to start patch for birth control now, no contraindications to estrogen, discussed waiting until 6 weeks postpartum to start due to increased VTE risk  - abdominal pain likely routine postpartum, low concern for ongoing infection since  no fevers or chills and afebrile  - no mood concerns today  - discussed continued PO iron use, will check blood counts at next visit    RTC in 1 month for next check in and annual visit.     Seen and discussed with Dr. Chelsea Hopson MD  PGY-1, Obstetrics and Gynecology

## 2025-05-29 ENCOUNTER — PHARMACY VISIT (OUTPATIENT)
Dept: PHARMACY | Facility: CLINIC | Age: 20
End: 2025-05-29
Payer: MEDICAID

## 2025-06-05 ENCOUNTER — APPOINTMENT (OUTPATIENT)
Dept: OBSTETRICS AND GYNECOLOGY | Facility: CLINIC | Age: 20
End: 2025-06-05
Payer: COMMERCIAL

## 2025-06-10 ENCOUNTER — HOSPITAL ENCOUNTER (EMERGENCY)
Facility: HOSPITAL | Age: 20
Discharge: HOME | End: 2025-06-10
Payer: COMMERCIAL

## 2025-06-10 VITALS
WEIGHT: 203 LBS | RESPIRATION RATE: 16 BRPM | HEART RATE: 58 BPM | BODY MASS INDEX: 28.42 KG/M2 | DIASTOLIC BLOOD PRESSURE: 80 MMHG | OXYGEN SATURATION: 98 % | SYSTOLIC BLOOD PRESSURE: 130 MMHG | TEMPERATURE: 96.1 F | HEIGHT: 71 IN

## 2025-06-10 DIAGNOSIS — R11.2 NAUSEA AND VOMITING, UNSPECIFIED VOMITING TYPE: Primary | ICD-10-CM

## 2025-06-10 LAB — PREGNANCY TEST URINE, POC: NEGATIVE

## 2025-06-10 PROCEDURE — 99283 EMERGENCY DEPT VISIT LOW MDM: CPT

## 2025-06-10 PROCEDURE — 81025 URINE PREGNANCY TEST: CPT | Performed by: NURSE PRACTITIONER

## 2025-06-10 PROCEDURE — 2500000004 HC RX 250 GENERAL PHARMACY W/ HCPCS (ALT 636 FOR OP/ED): Mod: SE

## 2025-06-10 PROCEDURE — 99284 EMERGENCY DEPT VISIT MOD MDM: CPT | Performed by: NURSE PRACTITIONER

## 2025-06-10 RX ORDER — ONDANSETRON 4 MG/1
4 TABLET, ORALLY DISINTEGRATING ORAL EVERY 6 HOURS PRN
Qty: 20 TABLET | Refills: 0 | Status: SHIPPED | OUTPATIENT
Start: 2025-06-10

## 2025-06-10 RX ORDER — ONDANSETRON 4 MG/1
4 TABLET, ORALLY DISINTEGRATING ORAL ONCE
Status: COMPLETED | OUTPATIENT
Start: 2025-06-10 | End: 2025-06-10

## 2025-06-10 RX ORDER — ONDANSETRON 4 MG/1
TABLET, ORALLY DISINTEGRATING ORAL
Status: COMPLETED
Start: 2025-06-10 | End: 2025-06-10

## 2025-06-10 RX ADMIN — ONDANSETRON 4 MG: 4 TABLET, ORALLY DISINTEGRATING ORAL at 06:31

## 2025-06-10 ASSESSMENT — COLUMBIA-SUICIDE SEVERITY RATING SCALE - C-SSRS
1. IN THE PAST MONTH, HAVE YOU WISHED YOU WERE DEAD OR WISHED YOU COULD GO TO SLEEP AND NOT WAKE UP?: NO
6. HAVE YOU EVER DONE ANYTHING, STARTED TO DO ANYTHING, OR PREPARED TO DO ANYTHING TO END YOUR LIFE?: NO
2. HAVE YOU ACTUALLY HAD ANY THOUGHTS OF KILLING YOURSELF?: NO

## 2025-06-10 ASSESSMENT — LIFESTYLE VARIABLES
HAVE YOU EVER FELT YOU SHOULD CUT DOWN ON YOUR DRINKING: NO
HAVE PEOPLE ANNOYED YOU BY CRITICIZING YOUR DRINKING: NO
EVER HAD A DRINK FIRST THING IN THE MORNING TO STEADY YOUR NERVES TO GET RID OF A HANGOVER: NO
EVER FELT BAD OR GUILTY ABOUT YOUR DRINKING: NO
TOTAL SCORE: 0

## 2025-06-10 ASSESSMENT — PAIN DESCRIPTION - LOCATION: LOCATION: ABDOMEN

## 2025-06-10 ASSESSMENT — PAIN - FUNCTIONAL ASSESSMENT: PAIN_FUNCTIONAL_ASSESSMENT: 0-10

## 2025-06-10 ASSESSMENT — PAIN DESCRIPTION - PAIN TYPE: TYPE: ACUTE PAIN

## 2025-06-10 ASSESSMENT — PAIN SCALES - GENERAL: PAINLEVEL_OUTOF10: 5 - MODERATE PAIN

## 2025-06-10 ASSESSMENT — PAIN DESCRIPTION - DESCRIPTORS: DESCRIPTORS: SHARP

## 2025-06-10 ASSESSMENT — PAIN DESCRIPTION - FREQUENCY: FREQUENCY: INTERMITTENT

## 2025-06-10 NOTE — Clinical Note
Juliet Kwok was seen and treated in our emergency department on 6/10/2025.  She may return to work on 06/11/2025.       If you have any questions or concerns, please don't hesitate to call.      Kate Ruggiero, APRN-CNP, DNP

## 2025-06-10 NOTE — ED TRIAGE NOTES
Patient presents to the ED for vomiting that began around 4:30am. States she was having a bowel movement that was watery and she vomited at the same time. LMP was 5/24
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
## 2025-07-02 ENCOUNTER — APPOINTMENT (OUTPATIENT)
Dept: RADIOLOGY | Facility: HOSPITAL | Age: 20
End: 2025-07-02
Payer: COMMERCIAL

## 2025-07-02 ENCOUNTER — HOSPITAL ENCOUNTER (EMERGENCY)
Facility: HOSPITAL | Age: 20
Discharge: HOME | End: 2025-07-02
Payer: COMMERCIAL

## 2025-07-02 VITALS
BODY MASS INDEX: 29.06 KG/M2 | RESPIRATION RATE: 16 BRPM | HEART RATE: 83 BPM | DIASTOLIC BLOOD PRESSURE: 82 MMHG | HEIGHT: 70 IN | SYSTOLIC BLOOD PRESSURE: 129 MMHG | TEMPERATURE: 98.3 F | OXYGEN SATURATION: 98 % | WEIGHT: 203 LBS

## 2025-07-02 DIAGNOSIS — M25.571 ACUTE RIGHT ANKLE PAIN: Primary | ICD-10-CM

## 2025-07-02 LAB — PREGNANCY TEST URINE, POC: NEGATIVE

## 2025-07-02 PROCEDURE — 81025 URINE PREGNANCY TEST: CPT

## 2025-07-02 PROCEDURE — 99284 EMERGENCY DEPT VISIT MOD MDM: CPT

## 2025-07-02 PROCEDURE — 96372 THER/PROPH/DIAG INJ SC/IM: CPT

## 2025-07-02 PROCEDURE — 2500000001 HC RX 250 WO HCPCS SELF ADMINISTERED DRUGS (ALT 637 FOR MEDICARE OP): Mod: SE

## 2025-07-02 PROCEDURE — 73610 X-RAY EXAM OF ANKLE: CPT | Mod: LT

## 2025-07-02 PROCEDURE — 2500000004 HC RX 250 GENERAL PHARMACY W/ HCPCS (ALT 636 FOR OP/ED): Mod: JZ,SE

## 2025-07-02 PROCEDURE — 73630 X-RAY EXAM OF FOOT: CPT | Mod: LT

## 2025-07-02 PROCEDURE — 73630 X-RAY EXAM OF FOOT: CPT | Mod: LEFT SIDE | Performed by: STUDENT IN AN ORGANIZED HEALTH CARE EDUCATION/TRAINING PROGRAM

## 2025-07-02 PROCEDURE — 73610 X-RAY EXAM OF ANKLE: CPT | Mod: LEFT SIDE | Performed by: STUDENT IN AN ORGANIZED HEALTH CARE EDUCATION/TRAINING PROGRAM

## 2025-07-02 RX ORDER — ACETAMINOPHEN 325 MG/1
650 TABLET ORAL EVERY 6 HOURS PRN
Qty: 20 TABLET | Refills: 0 | Status: SHIPPED | OUTPATIENT
Start: 2025-07-02 | End: 2025-07-08

## 2025-07-02 RX ORDER — IBUPROFEN 600 MG/1
600 TABLET, FILM COATED ORAL EVERY 6 HOURS PRN
Qty: 20 TABLET | Refills: 0 | Status: SHIPPED | OUTPATIENT
Start: 2025-07-02 | End: 2025-07-08

## 2025-07-02 RX ORDER — KETOROLAC TROMETHAMINE 15 MG/ML
15 INJECTION, SOLUTION INTRAMUSCULAR; INTRAVENOUS ONCE
Status: COMPLETED | OUTPATIENT
Start: 2025-07-02 | End: 2025-07-02

## 2025-07-02 RX ORDER — ACETAMINOPHEN 325 MG/1
975 TABLET ORAL ONCE
Status: COMPLETED | OUTPATIENT
Start: 2025-07-02 | End: 2025-07-02

## 2025-07-02 RX ADMIN — ACETAMINOPHEN 975 MG: 325 TABLET ORAL at 20:13

## 2025-07-02 RX ADMIN — KETOROLAC TROMETHAMINE 15 MG: 15 INJECTION, SOLUTION INTRAMUSCULAR; INTRAVENOUS at 20:13

## 2025-07-02 ASSESSMENT — PAIN DESCRIPTION - FREQUENCY: FREQUENCY: CONSTANT/CONTINUOUS

## 2025-07-02 ASSESSMENT — PAIN DESCRIPTION - LOCATION
LOCATION: FOOT
LOCATION: ANKLE

## 2025-07-02 ASSESSMENT — PAIN SCALES - GENERAL
PAINLEVEL_OUTOF10: 10 - WORST POSSIBLE PAIN
PAINLEVEL_OUTOF10: 10 - WORST POSSIBLE PAIN

## 2025-07-02 ASSESSMENT — PAIN DESCRIPTION - ORIENTATION
ORIENTATION: LEFT
ORIENTATION: LEFT

## 2025-07-02 ASSESSMENT — PAIN DESCRIPTION - PAIN TYPE: TYPE: ACUTE PAIN

## 2025-07-02 ASSESSMENT — PAIN - FUNCTIONAL ASSESSMENT
PAIN_FUNCTIONAL_ASSESSMENT: 0-10
PAIN_FUNCTIONAL_ASSESSMENT: 0-10

## 2025-07-02 ASSESSMENT — PAIN DESCRIPTION - ONSET: ONSET: ONGOING

## 2025-07-02 ASSESSMENT — PAIN DESCRIPTION - PROGRESSION: CLINICAL_PROGRESSION: NOT CHANGED

## 2025-07-02 ASSESSMENT — PAIN DESCRIPTION - DESCRIPTORS: DESCRIPTORS: ACHING;THROBBING

## 2025-07-03 NOTE — DISCHARGE INSTRUCTIONS
You were seen here for ankle pain.  I recommend that you wear the Ace wrap for comfort, use crutches when ambulating.  Take Tylenol/ibuprofen as needed for pain management.  Stay well-hydrated and obtain adequate rest.  Follow-up with orthopedics as soon as possible.  Return to ED for new or worsening of symptoms.
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
## 2025-07-18 ENCOUNTER — APPOINTMENT (OUTPATIENT)
Dept: ORTHOPEDIC SURGERY | Facility: HOSPITAL | Age: 20
End: 2025-07-18
Payer: COMMERCIAL

## 2025-07-24 ENCOUNTER — HOSPITAL ENCOUNTER (EMERGENCY)
Facility: HOSPITAL | Age: 20
Discharge: HOME | End: 2025-07-24
Attending: EMERGENCY MEDICINE
Payer: COMMERCIAL

## 2025-07-24 ENCOUNTER — APPOINTMENT (OUTPATIENT)
Dept: RADIOLOGY | Facility: HOSPITAL | Age: 20
End: 2025-07-24
Payer: COMMERCIAL

## 2025-07-24 ENCOUNTER — CLINICAL SUPPORT (OUTPATIENT)
Dept: EMERGENCY MEDICINE | Facility: HOSPITAL | Age: 20
End: 2025-07-24
Payer: COMMERCIAL

## 2025-07-24 VITALS
SYSTOLIC BLOOD PRESSURE: 132 MMHG | HEIGHT: 70 IN | OXYGEN SATURATION: 99 % | BODY MASS INDEX: 29.06 KG/M2 | RESPIRATION RATE: 18 BRPM | DIASTOLIC BLOOD PRESSURE: 80 MMHG | TEMPERATURE: 97.5 F | HEART RATE: 98 BPM | WEIGHT: 203 LBS
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
## 2025-07-24 DIAGNOSIS — M79.642 LEFT HAND PAIN: ICD-10-CM

## 2025-07-24 DIAGNOSIS — T71.193A ASPHYXIATION BY STRANGULATION, ASSAULT, INITIAL ENCOUNTER: ICD-10-CM

## 2025-07-24 LAB
ATRIAL RATE: 95 BPM
HCG UR QL IA.RAPID: POSITIVE
P AXIS: 63 DEGREES
P OFFSET: 211 MS
P ONSET: 158 MS
PR INTERVAL: 134 MS
PREGNANCY TEST URINE, POC: POSITIVE
Q ONSET: 225 MS
QRS COUNT: 16 BEATS
QRS DURATION: 94 MS
QT INTERVAL: 378 MS
QTC CALCULATION(BAZETT): 475 MS
QTC FREDERICIA: 440 MS
R AXIS: 43 DEGREES
T AXIS: 63 DEGREES
T OFFSET: 414 MS
VENTRICULAR RATE: 95 BPM

## 2025-07-24 PROCEDURE — 81025 URINE PREGNANCY TEST: CPT

## 2025-07-24 PROCEDURE — 71046 X-RAY EXAM CHEST 2 VIEWS: CPT | Performed by: RADIOLOGY

## 2025-07-24 PROCEDURE — 73110 X-RAY EXAM OF WRIST: CPT | Mod: LEFT SIDE | Performed by: RADIOLOGY

## 2025-07-24 PROCEDURE — 70498 CT ANGIOGRAPHY NECK: CPT

## 2025-07-24 PROCEDURE — 73130 X-RAY EXAM OF HAND: CPT | Mod: LEFT SIDE | Performed by: RADIOLOGY

## 2025-07-24 PROCEDURE — 73110 X-RAY EXAM OF WRIST: CPT | Mod: LT

## 2025-07-24 PROCEDURE — 2500000004 HC RX 250 GENERAL PHARMACY W/ HCPCS (ALT 636 FOR OP/ED): Mod: SE

## 2025-07-24 PROCEDURE — 2500000005 HC RX 250 GENERAL PHARMACY W/O HCPCS: Mod: SE

## 2025-07-24 PROCEDURE — 70450 CT HEAD/BRAIN W/O DYE: CPT | Performed by: STUDENT IN AN ORGANIZED HEALTH CARE EDUCATION/TRAINING PROGRAM

## 2025-07-24 PROCEDURE — 2500000001 HC RX 250 WO HCPCS SELF ADMINISTERED DRUGS (ALT 637 FOR MEDICARE OP): Mod: SE

## 2025-07-24 PROCEDURE — 70498 CT ANGIOGRAPHY NECK: CPT | Performed by: STUDENT IN AN ORGANIZED HEALTH CARE EDUCATION/TRAINING PROGRAM

## 2025-07-24 PROCEDURE — 71046 X-RAY EXAM CHEST 2 VIEWS: CPT

## 2025-07-24 PROCEDURE — 2550000001 HC RX 255 CONTRASTS: Mod: SE | Performed by: EMERGENCY MEDICINE

## 2025-07-24 PROCEDURE — 70450 CT HEAD/BRAIN W/O DYE: CPT

## 2025-07-24 PROCEDURE — 73130 X-RAY EXAM OF HAND: CPT | Mod: LT

## 2025-07-24 PROCEDURE — 99285 EMERGENCY DEPT VISIT HI MDM: CPT | Mod: 25 | Performed by: EMERGENCY MEDICINE

## 2025-07-24 PROCEDURE — 81025 URINE PREGNANCY TEST: CPT | Performed by: EMERGENCY MEDICINE

## 2025-07-24 PROCEDURE — 29125 APPL SHORT ARM SPLINT STATIC: CPT | Mod: FA

## 2025-07-24 PROCEDURE — 93005 ELECTROCARDIOGRAM TRACING: CPT

## 2025-07-24 RX ORDER — LIDOCAINE HYDROCHLORIDE 20 MG/ML
15 SOLUTION OROPHARYNGEAL ONCE
Status: COMPLETED | OUTPATIENT
Start: 2025-07-24 | End: 2025-07-24

## 2025-07-24 RX ORDER — ALUMINUM HYDROXIDE, MAGNESIUM HYDROXIDE, AND SIMETHICONE 1200; 120; 1200 MG/30ML; MG/30ML; MG/30ML
30 SUSPENSION ORAL ONCE
Status: COMPLETED | OUTPATIENT
Start: 2025-07-24 | End: 2025-07-24

## 2025-07-24 RX ORDER — ACETAMINOPHEN 325 MG/1
975 TABLET ORAL ONCE
Status: COMPLETED | OUTPATIENT
Start: 2025-07-24 | End: 2025-07-24

## 2025-07-24 RX ORDER — ONDANSETRON 4 MG/1
4 TABLET, ORALLY DISINTEGRATING ORAL ONCE
Status: COMPLETED | OUTPATIENT
Start: 2025-07-24 | End: 2025-07-24

## 2025-07-24 RX ADMIN — LIDOCAINE HYDROCHLORIDE 15 ML: 20 SOLUTION ORAL at 03:27

## 2025-07-24 RX ADMIN — IOHEXOL 80 ML: 350 INJECTION, SOLUTION INTRAVENOUS at 04:51

## 2025-07-24 RX ADMIN — ACETAMINOPHEN 975 MG: 325 TABLET ORAL at 03:27

## 2025-07-24 RX ADMIN — ONDANSETRON 4 MG: 4 TABLET, ORALLY DISINTEGRATING ORAL at 03:27

## 2025-07-24 RX ADMIN — ALUMINUM HYDROXIDE, MAGNESIUM HYDROXIDE, AND SIMETHICONE 30 ML: 200; 200; 20 SUSPENSION ORAL at 03:27

## 2025-07-24 ASSESSMENT — LIFESTYLE VARIABLES
EVER HAD A DRINK FIRST THING IN THE MORNING TO STEADY YOUR NERVES TO GET RID OF A HANGOVER: NO
EVER FELT BAD OR GUILTY ABOUT YOUR DRINKING: NO
HAVE PEOPLE ANNOYED YOU BY CRITICIZING YOUR DRINKING: NO
HAVE YOU EVER FELT YOU SHOULD CUT DOWN ON YOUR DRINKING: NO
TOTAL SCORE: 0

## 2025-07-24 ASSESSMENT — PAIN SCALES - GENERAL
PAINLEVEL_OUTOF10: 6
PAINLEVEL_OUTOF10: 10 - WORST POSSIBLE PAIN

## 2025-07-24 ASSESSMENT — PAIN DESCRIPTION - DESCRIPTORS: DESCRIPTORS: ACHING

## 2025-07-24 ASSESSMENT — PAIN DESCRIPTION - LOCATION: LOCATION: ABDOMEN

## 2025-07-24 ASSESSMENT — PAIN DESCRIPTION - PAIN TYPE: TYPE: ACUTE PAIN

## 2025-07-24 ASSESSMENT — PAIN DESCRIPTION - FREQUENCY: FREQUENCY: CONSTANT/CONTINUOUS

## 2025-07-24 ASSESSMENT — PAIN - FUNCTIONAL ASSESSMENT
PAIN_FUNCTIONAL_ASSESSMENT: 0-10
PAIN_FUNCTIONAL_ASSESSMENT: 0-10
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
## 2025-08-04 ENCOUNTER — HOSPITAL ENCOUNTER (EMERGENCY)
Facility: HOSPITAL | Age: 20
Discharge: LEFT BEFORE DISCHARGE | End: 2025-08-04
Payer: COMMERCIAL

## 2025-08-04 ENCOUNTER — APPOINTMENT (OUTPATIENT)
Dept: RADIOLOGY | Facility: HOSPITAL | Age: 20
End: 2025-08-04
Payer: COMMERCIAL

## 2025-08-04 ENCOUNTER — APPOINTMENT (OUTPATIENT)
Dept: OBSTETRICS AND GYNECOLOGY | Facility: CLINIC | Age: 20
End: 2025-08-04
Payer: COMMERCIAL

## 2025-08-04 VITALS
OXYGEN SATURATION: 99 % | SYSTOLIC BLOOD PRESSURE: 120 MMHG | BODY MASS INDEX: 28.42 KG/M2 | TEMPERATURE: 97.7 F | WEIGHT: 203 LBS | HEART RATE: 82 BPM | HEIGHT: 71 IN | DIASTOLIC BLOOD PRESSURE: 70 MMHG | RESPIRATION RATE: 18 BRPM

## 2025-08-04 PROCEDURE — 4500999001 HC ED NO CHARGE

## 2025-08-04 PROCEDURE — 99281 EMR DPT VST MAYX REQ PHY/QHP: CPT

## 2025-08-04 ASSESSMENT — PAIN - FUNCTIONAL ASSESSMENT: PAIN_FUNCTIONAL_ASSESSMENT: 0-10

## 2025-08-04 ASSESSMENT — PAIN SCALES - GENERAL: PAINLEVEL_OUTOF10: 8

## 2025-08-04 ASSESSMENT — PAIN DESCRIPTION - LOCATION: LOCATION: ABDOMEN

## 2025-08-12 ENCOUNTER — APPOINTMENT (OUTPATIENT)
Dept: PEDIATRICS | Facility: CLINIC | Age: 20
End: 2025-08-12
Payer: COMMERCIAL

## 2025-08-14 ENCOUNTER — PHARMACY VISIT (OUTPATIENT)
Dept: PHARMACY | Facility: CLINIC | Age: 20
End: 2025-08-14
Payer: MEDICAID

## 2025-08-14 ENCOUNTER — INITIAL PRENATAL (OUTPATIENT)
Dept: OBSTETRICS AND GYNECOLOGY | Facility: CLINIC | Age: 20
End: 2025-08-14
Payer: COMMERCIAL

## 2025-08-14 VITALS — SYSTOLIC BLOOD PRESSURE: 125 MMHG | WEIGHT: 196.4 LBS | DIASTOLIC BLOOD PRESSURE: 71 MMHG | BODY MASS INDEX: 27.39 KG/M2

## 2025-08-14 DIAGNOSIS — J45.909 ASTHMA AFFECTING PREGNANCY, ANTEPARTUM (HHS-HCC): ICD-10-CM

## 2025-08-14 DIAGNOSIS — N89.8 VAGINAL IRRITATION: ICD-10-CM

## 2025-08-14 DIAGNOSIS — O99.513 ASTHMA AFFECTING PREGNANCY IN THIRD TRIMESTER (HHS-HCC): ICD-10-CM

## 2025-08-14 DIAGNOSIS — O21.9 NAUSEA AND VOMITING IN PREGNANCY PRIOR TO 22 WEEKS GESTATION: Primary | ICD-10-CM

## 2025-08-14 DIAGNOSIS — Z87.898 HISTORY OF DOMESTIC VIOLENCE: ICD-10-CM

## 2025-08-14 DIAGNOSIS — Z87.59 HISTORY OF GESTATIONAL HYPERTENSION: ICD-10-CM

## 2025-08-14 DIAGNOSIS — J45.909 ASTHMA AFFECTING PREGNANCY IN THIRD TRIMESTER (HHS-HCC): ICD-10-CM

## 2025-08-14 DIAGNOSIS — O99.519 ASTHMA AFFECTING PREGNANCY, ANTEPARTUM (HHS-HCC): ICD-10-CM

## 2025-08-14 DIAGNOSIS — Z3A.08 8 WEEKS GESTATION OF PREGNANCY (HHS-HCC): ICD-10-CM

## 2025-08-14 PROBLEM — Z3A.38 38 WEEKS GESTATION OF PREGNANCY (HHS-HCC): Status: RESOLVED | Noted: 2024-10-07 | Resolved: 2025-08-14

## 2025-08-14 PROBLEM — E55.9 VITAMIN D DEFICIENCY: Status: RESOLVED | Noted: 2023-09-26 | Resolved: 2025-08-14

## 2025-08-14 PROBLEM — Z34.90 ENCOUNTER FOR INDUCTION OF LABOR: Status: RESOLVED | Noted: 2025-04-14 | Resolved: 2025-08-14

## 2025-08-14 PROBLEM — O09.891 SHORT INTERVAL BETWEEN PREGNANCIES AFFECTING PREGNANCY IN FIRST TRIMESTER, ANTEPARTUM (HHS-HCC): Status: ACTIVE | Noted: 2025-08-14

## 2025-08-14 PROBLEM — Z59.819 HOUSING INSECURITY: Status: RESOLVED | Noted: 2024-12-12 | Resolved: 2025-08-14

## 2025-08-14 PROBLEM — O20.9 VAGINAL BLEEDING IN PREGNANCY, FIRST TRIMESTER (HHS-HCC): Status: RESOLVED | Noted: 2024-10-07 | Resolved: 2025-08-14

## 2025-08-14 PROCEDURE — RXMED WILLOW AMBULATORY MEDICATION CHARGE

## 2025-08-14 PROCEDURE — 99214 OFFICE O/P EST MOD 30 MIN: CPT

## 2025-08-14 PROCEDURE — 99212 OFFICE O/P EST SF 10 MIN: CPT

## 2025-08-14 RX ORDER — PYRIDOXINE HCL (VITAMIN B6) 25 MG
25 TABLET ORAL 3 TIMES DAILY
Qty: 270 TABLET | Refills: 2 | Status: SHIPPED | OUTPATIENT
Start: 2025-08-14 | End: 2026-05-11

## 2025-08-14 RX ORDER — ALBUTEROL SULFATE 90 UG/1
2 INHALANT RESPIRATORY (INHALATION) EVERY 6 HOURS PRN
Qty: 18 G | Refills: 11 | Status: SHIPPED | OUTPATIENT
Start: 2025-08-14 | End: 2026-08-14

## 2025-08-14 RX ORDER — FLUTICASONE PROPIONATE 44 UG/1
1 AEROSOL, METERED RESPIRATORY (INHALATION)
Qty: 10.6 G | Refills: 11 | Status: SHIPPED | OUTPATIENT
Start: 2025-08-14 | End: 2026-08-14

## 2025-08-14 RX ORDER — POLYETHYLENE GLYCOL 3350 17 G/17G
17 POWDER, FOR SOLUTION ORAL DAILY
Qty: 510 G | Refills: 3 | Status: SHIPPED | OUTPATIENT
Start: 2025-08-14 | End: 2025-12-12

## 2025-08-14 RX ORDER — ASPIRIN 81 MG/1
81 TABLET ORAL DAILY
Qty: 90 TABLET | Refills: 2 | Status: SHIPPED | OUTPATIENT
Start: 2025-08-14 | End: 2026-05-11

## 2025-08-14 ASSESSMENT — ENCOUNTER SYMPTOMS
HEMATOLOGIC/LYMPHATIC NEGATIVE: 0
EYES NEGATIVE: 0
CARDIOVASCULAR NEGATIVE: 0
NEUROLOGICAL NEGATIVE: 0
ALLERGIC/IMMUNOLOGIC NEGATIVE: 0
CONSTITUTIONAL NEGATIVE: 0
PSYCHIATRIC NEGATIVE: 0
RESPIRATORY NEGATIVE: 0
GASTROINTESTINAL NEGATIVE: 0
ENDOCRINE NEGATIVE: 0
MUSCULOSKELETAL NEGATIVE: 0

## 2025-08-15 LAB
BV SCORE VAG QL: NORMAL
CREAT UR-MCNC: 343 MG/DL (ref 20–275)
PROT UR-MCNC: 25 MG/DL (ref 5–24)
PROT/CREAT UR: 0.07 MG/MG CREAT (ref 0.02–0.18)
PROT/CREAT UR: 73 MG/G CREAT (ref 24–184)

## 2025-08-16 LAB
BACTERIA UR CULT: NORMAL
C TRACH RRNA SPEC QL NAA+PROBE: DETECTED
N GONORRHOEA RRNA SPEC QL NAA+PROBE: DETECTED
QUEST GC CT AMPLIFIED (ALWAYS MESSAGE): ABNORMAL
T VAGINALIS RRNA SPEC QL NAA+PROBE: NOT DETECTED

## 2025-08-18 ENCOUNTER — PHARMACY VISIT (OUTPATIENT)
Dept: PHARMACY | Facility: CLINIC | Age: 20
End: 2025-08-18
Payer: MEDICAID

## 2025-08-18 ENCOUNTER — CLINICAL SUPPORT (OUTPATIENT)
Dept: OBSTETRICS AND GYNECOLOGY | Facility: CLINIC | Age: 20
End: 2025-08-18
Payer: COMMERCIAL

## 2025-08-18 ENCOUNTER — HOSPITAL ENCOUNTER (OUTPATIENT)
Dept: RADIOLOGY | Facility: CLINIC | Age: 20
Discharge: HOME | End: 2025-08-18
Payer: COMMERCIAL

## 2025-08-18 DIAGNOSIS — A56.8: Primary | ICD-10-CM

## 2025-08-18 DIAGNOSIS — O98.311: Primary | ICD-10-CM

## 2025-08-18 DIAGNOSIS — O98.211 GONORRHEA IN PREGNANCY, ANTEPARTUM, FIRST TRIMESTER: Primary | ICD-10-CM

## 2025-08-18 DIAGNOSIS — Z3A.01 7 WEEKS GESTATION OF PREGNANCY (HHS-HCC): ICD-10-CM

## 2025-08-18 PROCEDURE — 76801 OB US < 14 WKS SINGLE FETUS: CPT

## 2025-08-18 PROCEDURE — 99211 OFF/OP EST MAY X REQ PHY/QHP: CPT | Mod: 25

## 2025-08-18 PROCEDURE — 2500000004 HC RX 250 GENERAL PHARMACY W/ HCPCS (ALT 636 FOR OP/ED): Mod: SE

## 2025-08-18 PROCEDURE — 96372 THER/PROPH/DIAG INJ SC/IM: CPT

## 2025-08-18 PROCEDURE — RXMED WILLOW AMBULATORY MEDICATION CHARGE

## 2025-08-18 PROCEDURE — 76801 OB US < 14 WKS SINGLE FETUS: CPT | Performed by: STUDENT IN AN ORGANIZED HEALTH CARE EDUCATION/TRAINING PROGRAM

## 2025-08-18 RX ORDER — CEFTRIAXONE 1 G/1
1 INJECTION, POWDER, FOR SOLUTION INTRAMUSCULAR; INTRAVENOUS ONCE
Status: DISCONTINUED | OUTPATIENT
Start: 2025-08-18 | End: 2025-08-18

## 2025-08-18 RX ORDER — CEFTRIAXONE 500 MG/1
500 INJECTION, POWDER, FOR SOLUTION INTRAMUSCULAR; INTRAVENOUS ONCE
Status: SHIPPED | OUTPATIENT
Start: 2025-08-18

## 2025-08-18 RX ORDER — AZITHROMYCIN 500 MG/1
1000 TABLET, FILM COATED ORAL ONCE
Qty: 2 TABLET | Refills: 0 | Status: SHIPPED | OUTPATIENT
Start: 2025-08-18 | End: 2025-08-19

## 2025-08-18 RX ORDER — AZITHROMYCIN 1 G/1
1 POWDER, FOR SUSPENSION ORAL ONCE
Qty: 1 PACKET | Refills: 0 | Status: SHIPPED | OUTPATIENT
Start: 2025-08-18 | End: 2025-08-18 | Stop reason: CLARIF

## 2025-08-18 RX ADMIN — CEFTRIAXONE SODIUM 500 MG: 500 INJECTION, POWDER, FOR SOLUTION INTRAMUSCULAR; INTRAVENOUS at 15:20

## 2025-08-27 ENCOUNTER — APPOINTMENT (OUTPATIENT)
Dept: RADIOLOGY | Facility: HOSPITAL | Age: 20
End: 2025-08-27
Payer: COMMERCIAL

## 2025-08-27 ENCOUNTER — HOSPITAL ENCOUNTER (EMERGENCY)
Facility: HOSPITAL | Age: 20
Discharge: HOME | End: 2025-08-27
Attending: EMERGENCY MEDICINE
Payer: COMMERCIAL

## 2025-08-27 VITALS
HEIGHT: 71 IN | WEIGHT: 197 LBS | TEMPERATURE: 98.3 F | DIASTOLIC BLOOD PRESSURE: 80 MMHG | BODY MASS INDEX: 27.58 KG/M2 | RESPIRATION RATE: 20 BRPM | SYSTOLIC BLOOD PRESSURE: 125 MMHG | OXYGEN SATURATION: 99 % | HEART RATE: 73 BPM
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
## 2025-08-27 LAB
ALBUMIN SERPL BCP-MCNC: 4.3 G/DL (ref 3.4–5)
ALP SERPL-CCNC: 78 U/L (ref 33–110)
ALT SERPL W P-5'-P-CCNC: 9 U/L (ref 7–45)
ANION GAP SERPL CALC-SCNC: 13 MMOL/L (ref 10–20)
AST SERPL W P-5'-P-CCNC: 13 U/L (ref 9–39)
BASOPHILS # BLD AUTO: 0.03 X10*3/UL (ref 0–0.1)
BASOPHILS NFR BLD AUTO: 0.4 %
BILIRUB SERPL-MCNC: 0.6 MG/DL (ref 0–1.2)
BUN SERPL-MCNC: 4 MG/DL (ref 6–23)
CALCIUM SERPL-MCNC: 9.5 MG/DL (ref 8.6–10.6)
CHLORIDE SERPL-SCNC: 103 MMOL/L (ref 98–107)
CO2 SERPL-SCNC: 24 MMOL/L (ref 21–32)
CREAT SERPL-MCNC: 0.47 MG/DL (ref 0.5–1.05)
EGFRCR SERPLBLD CKD-EPI 2021: >90 ML/MIN/1.73M*2
EOSINOPHIL # BLD AUTO: 0.26 X10*3/UL (ref 0–0.7)
EOSINOPHIL NFR BLD AUTO: 3.5 %
ERYTHROCYTE [DISTWIDTH] IN BLOOD BY AUTOMATED COUNT: 13.6 % (ref 11.5–14.5)
GLUCOSE SERPL-MCNC: 75 MG/DL (ref 74–99)
HCT VFR BLD AUTO: 33.5 % (ref 36–46)
HGB BLD-MCNC: 11.2 G/DL (ref 12–16)
IMM GRANULOCYTES # BLD AUTO: 0.03 X10*3/UL (ref 0–0.7)
IMM GRANULOCYTES NFR BLD AUTO: 0.4 % (ref 0–0.9)
LYMPHOCYTES # BLD AUTO: 1.76 X10*3/UL (ref 1.2–4.8)
LYMPHOCYTES NFR BLD AUTO: 23.4 %
MAGNESIUM SERPL-MCNC: 1.98 MG/DL (ref 1.6–2.4)
MCH RBC QN AUTO: 24.7 PG (ref 26–34)
MCHC RBC AUTO-ENTMCNC: 33.4 G/DL (ref 32–36)
MCV RBC AUTO: 74 FL (ref 80–100)
MONOCYTES # BLD AUTO: 0.54 X10*3/UL (ref 0.1–1)
MONOCYTES NFR BLD AUTO: 7.2 %
NEUTROPHILS # BLD AUTO: 4.89 X10*3/UL (ref 1.2–7.7)
NEUTROPHILS NFR BLD AUTO: 65.1 %
NRBC BLD-RTO: 0 /100 WBCS (ref 0–0)
PLATELET # BLD AUTO: 352 X10*3/UL (ref 150–450)
POTASSIUM SERPL-SCNC: 3 MMOL/L (ref 3.5–5.3)
PROT SERPL-MCNC: 7.6 G/DL (ref 6.4–8.2)
RBC # BLD AUTO: 4.54 X10*6/UL (ref 4–5.2)
SODIUM SERPL-SCNC: 137 MMOL/L (ref 136–145)
WBC # BLD AUTO: 7.5 X10*3/UL (ref 4.4–11.3)

## 2025-08-27 PROCEDURE — 85025 COMPLETE CBC W/AUTO DIFF WBC: CPT

## 2025-08-27 PROCEDURE — 2500000004 HC RX 250 GENERAL PHARMACY W/ HCPCS (ALT 636 FOR OP/ED): Mod: JZ,SE

## 2025-08-27 PROCEDURE — 80053 COMPREHEN METABOLIC PANEL: CPT

## 2025-08-27 PROCEDURE — 2500000005 HC RX 250 GENERAL PHARMACY W/O HCPCS: Mod: SE

## 2025-08-27 PROCEDURE — 83735 ASSAY OF MAGNESIUM: CPT

## 2025-08-27 PROCEDURE — 99284 EMERGENCY DEPT VISIT MOD MDM: CPT | Mod: 25 | Performed by: EMERGENCY MEDICINE

## 2025-08-27 PROCEDURE — 96374 THER/PROPH/DIAG INJ IV PUSH: CPT

## 2025-08-27 PROCEDURE — 36415 COLL VENOUS BLD VENIPUNCTURE: CPT

## 2025-08-27 PROCEDURE — 2500000001 HC RX 250 WO HCPCS SELF ADMINISTERED DRUGS (ALT 637 FOR MEDICARE OP): Mod: SE

## 2025-08-27 RX ORDER — LIDOCAINE 560 MG/1
1 PATCH PERCUTANEOUS; TOPICAL; TRANSDERMAL DAILY
Status: DISCONTINUED | OUTPATIENT
Start: 2025-08-27 | End: 2025-08-27 | Stop reason: HOSPADM

## 2025-08-27 RX ORDER — ACETAMINOPHEN 325 MG/1
975 TABLET ORAL ONCE
Status: COMPLETED | OUTPATIENT
Start: 2025-08-27 | End: 2025-08-27

## 2025-08-27 RX ORDER — METOCLOPRAMIDE HYDROCHLORIDE 5 MG/ML
10 INJECTION INTRAMUSCULAR; INTRAVENOUS ONCE
Status: COMPLETED | OUTPATIENT
Start: 2025-08-27 | End: 2025-08-27

## 2025-08-27 RX ADMIN — METOCLOPRAMIDE 10 MG: 5 INJECTION, SOLUTION INTRAMUSCULAR; INTRAVENOUS at 16:24

## 2025-08-27 RX ADMIN — LIDOCAINE 4% 1 PATCH: 40 PATCH TOPICAL at 16:24

## 2025-08-27 RX ADMIN — ACETAMINOPHEN 975 MG: 325 TABLET ORAL at 16:24

## 2025-08-27 ASSESSMENT — LIFESTYLE VARIABLES
EVER HAD A DRINK FIRST THING IN THE MORNING TO STEADY YOUR NERVES TO GET RID OF A HANGOVER: NO
HAVE PEOPLE ANNOYED YOU BY CRITICIZING YOUR DRINKING: NO
HAVE YOU EVER FELT YOU SHOULD CUT DOWN ON YOUR DRINKING: NO
EVER FELT BAD OR GUILTY ABOUT YOUR DRINKING: NO
TOTAL SCORE: 0

## 2025-08-27 ASSESSMENT — PAIN DESCRIPTION - ORIENTATION: ORIENTATION: RIGHT

## 2025-08-27 ASSESSMENT — PAIN SCALES - GENERAL
PAINLEVEL_OUTOF10: 8
PAINLEVEL_OUTOF10: 8
PAINLEVEL_OUTOF10: 0 - NO PAIN

## 2025-08-27 ASSESSMENT — PAIN DESCRIPTION - DESCRIPTORS: DESCRIPTORS: SHARP

## 2025-08-27 ASSESSMENT — PAIN DESCRIPTION - FREQUENCY: FREQUENCY: CONSTANT/CONTINUOUS

## 2025-08-27 ASSESSMENT — PAIN - FUNCTIONAL ASSESSMENT
PAIN_FUNCTIONAL_ASSESSMENT: 0-10
PAIN_FUNCTIONAL_ASSESSMENT: 0-10

## 2025-08-27 ASSESSMENT — PAIN DESCRIPTION - LOCATION: LOCATION: NECK

## 2025-08-27 ASSESSMENT — PAIN DESCRIPTION - PAIN TYPE: TYPE: ACUTE PAIN

## 2025-08-28 LAB
HOLD SPECIMEN: NORMAL
HOLD SPECIMEN: NORMAL